# Patient Record
(demographics unavailable — no encounter records)

---

## 2024-12-11 NOTE — VITALS
[Maximal Pain Intensity: 0/10] : 0/10 [Least Pain Intensity: 0/10] : 0/10 [80: Normal activity with effort; some signs or symptoms of disease.] : 80: Normal activity with effort; some signs or symptoms of disease.  [Date: ____________] : Patient's last distress assessment performed on [unfilled]. [6 - Distress Level] : Distress Level: 6

## 2024-12-11 NOTE — PHYSICAL EXAM
[] : no respiratory distress [Exaggerated Use Of Accessory Muscles For Inspiration] : no accessory muscle use [Heart Rate And Rhythm] : heart rate and rhythm were normal [Arterial Pulses Normal] : the arterial pulses were normal [Abdomen Soft] : soft [Nondistended] : nondistended [Normal] : oriented to person, place and time, the affect was normal, the mood was normal and not anxious [Normal Oral Cavity] : oral cavity was normal [Oropharynx] : The oropharynx was normal [de-identified] : No neck nodes.  No supraclavicular nodes

## 2024-12-11 NOTE — PHYSICAL EXAM
[] : no respiratory distress [Exaggerated Use Of Accessory Muscles For Inspiration] : no accessory muscle use [Heart Rate And Rhythm] : heart rate and rhythm were normal [Arterial Pulses Normal] : the arterial pulses were normal [Abdomen Soft] : soft [Nondistended] : nondistended [Normal] : oriented to person, place and time, the affect was normal, the mood was normal and not anxious [Normal Oral Cavity] : oral cavity was normal [Oropharynx] : The oropharynx was normal [de-identified] : No neck nodes.  No supraclavicular nodes

## 2024-12-11 NOTE — HISTORY OF PRESENT ILLNESS
[FreeTextEntry1] : Mr. 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  He initially presented with unexpected weight loss associated with a poor appetite.    10/24/24 CT Abdomen/Pelvis ([facility])  Impression:  1. Moderate hiatal hernia. Prominent incompletely imaged thickening of the distal esophagus and hiatal hernia. Recommend correlation with upper endoscopy if not previously assessed.  2. S/p left nephrectomy. Cholelithiasis. No lymphadenopathy or ascites.   11/19/24 EGD with Dr. Bains showed a circumferential, friable, stenotic mass lesion extending from mid esophagus at 28cm to the EG Junction at 40cm. Pathology revealed an invasive poorly differentiated adenocarcinoma, with signet cell and mucinous features. The depth of invasion cannot be evaluated d/t the fragmentation and superficial nature of the specimens. MMR Intact.  He also had a colonoscopy done at the same time showing tubular adenoma. Colonic mucosa, negative for dysplasia. There is no evidence of high-grade dysplasia or malignancy.  CA-19-9: 301 U/ml  CEA: 16.6 ng/ml   11/26/24 CT Abdomen/Pelvis ([facility]) showed a moderate amount of stool in the colon. Hiatal hernia again noted. Prominent thickening of the visualized esophagus again noted. Exophytic component of the mass versus enlarged paraesophageal lymph node measuring ~ 1.9 x 1.3cm.  - No evidence of acute inflammatory process in the abdomen or pelvis.  - s/p left nephrectomy.   11/28/24 CT C/A/P ([facility])  Impression:  - Mass-like thickening of the distal esophagus compatible with primary malignancy.  - There is a 2cm right paraesophageal lymph node.  - Multiple tiny pulmonary nodules. Although they dont have suspicious features, they are technically indeterminate because of their multiplicity and the patient's history. They are too small to evaluate via PET-CT. Can consider close interval follow-up.   12/4/24 PET/CT ([facility])  1. Intense increased FDG avidity is associated with a segment of diffuse thickening of the mid and distal esophagus, SUV max 4.6  2. A 2cm paraesophageal node, has mild FDG avidity, SUV max 2.5. Another probable paraesophageal nodule at the level of the diaphragm measure 1.8 x 1.4cm, SUV max 2.7.  3. No FDG-avid pulmonary nodule. Previously seen small pulmonary nodules are below the resolution of PET-CT scan. A trace right pleural effusion is new and associated with minimal FDG avidity, SUV max 1.7.   12/10/24 Mr. Garnre presents today for consultation for radiation as referred by Dr. Gerardo. They discussed carboplatin/paclitaxel concurrently with radiation. Patient reports of fatigue, hiccups, occasional loss of voice & hoarseness, and left chest discomfort/pressure. He denies any chest pain, SOB, difficulty swallowing. He started drinking boost as recommended by Dr. Gerardo, he is able to tolerate food without difficulty.

## 2024-12-11 NOTE — REVIEW OF SYSTEMS
[Fatigue] : fatigue [Recent Change In Weight] : ~T recent weight change [Hoarseness] : hoarseness [Negative] : Allergic/Immunologic [Chest Pain] : no chest pain [Shortness Of Breath] : no shortness of breath [Abdominal Pain] : no abdominal pain [Constipation] : no constipation [Diarrhea] : no diarrhea [FreeTextEntry4] : hiccups [FreeTextEntry5] : left chest discomfort/pressure

## 2024-12-11 NOTE — HISTORY OF PRESENT ILLNESS
[FreeTextEntry1] : Mr. 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  He initially presented with unexpected weight loss associated with a poor appetite.    10/24/24 CT Abdomen/Pelvis ([facility])  Impression:  1. Moderate hiatal hernia. Prominent incompletely imaged thickening of the distal esophagus and hiatal hernia. Recommend correlation with upper endoscopy if not previously assessed.  2. S/p left nephrectomy. Cholelithiasis. No lymphadenopathy or ascites.   11/19/24 EGD with Dr. Bains showed a circumferential, friable, stenotic mass lesion extending from mid esophagus at 28cm to the EG Junction at 40cm. Pathology revealed an invasive poorly differentiated adenocarcinoma, with signet cell and mucinous features. The depth of invasion cannot be evaluated d/t the fragmentation and superficial nature of the specimens. MMR Intact.  He also had a colonoscopy done at the same time showing tubular adenoma. Colonic mucosa, negative for dysplasia. There is no evidence of high-grade dysplasia or malignancy.  CA-19-9: 301 U/ml  CEA: 16.6 ng/ml   11/26/24 CT Abdomen/Pelvis ([facility]) showed a moderate amount of stool in the colon. Hiatal hernia again noted. Prominent thickening of the visualized esophagus again noted. Exophytic component of the mass versus enlarged paraesophageal lymph node measuring ~ 1.9 x 1.3cm.  - No evidence of acute inflammatory process in the abdomen or pelvis.  - s/p left nephrectomy.   11/28/24 CT C/A/P ([facility])  Impression:  - Mass-like thickening of the distal esophagus compatible with primary malignancy.  - There is a 2cm right paraesophageal lymph node.  - Multiple tiny pulmonary nodules. Although they dont have suspicious features, they are technically indeterminate because of their multiplicity and the patient's history. They are too small to evaluate via PET-CT. Can consider close interval follow-up.   12/4/24 PET/CT ([facility])  1. Intense increased FDG avidity is associated with a segment of diffuse thickening of the mid and distal esophagus, SUV max 4.6  2. A 2cm paraesophageal node, has mild FDG avidity, SUV max 2.5. Another probable paraesophageal nodule at the level of the diaphragm measure 1.8 x 1.4cm, SUV max 2.7.  3. No FDG-avid pulmonary nodule. Previously seen small pulmonary nodules are below the resolution of PET-CT scan. A trace right pleural effusion is new and associated with minimal FDG avidity, SUV max 1.7.   12/10/24 Mr. Garner presents today for consultation for radiation as referred by Dr. Gerardo. They discussed carboplatin/paclitaxel concurrently with radiation. Patient reports of fatigue, hiccups, occasional loss of voice & hoarseness, and left chest discomfort/pressure. He denies any chest pain, SOB, difficulty swallowing. He started drinking boost as recommended by Dr. Gerardo, he is able to tolerate food without difficulty.

## 2024-12-20 NOTE — REVIEW OF SYSTEMS
[Fever] : no fever [Chills] : no chills [Feeling Tired] : feeling tired [Arthralgias] : arthralgias [Joint Stiffness] : joint stiffness [Negative] : Psychiatric

## 2024-12-20 NOTE — HISTORY OF PRESENT ILLNESS
[FreeTextEntry1] : MILLIE CISSE is a 73 year old M, nonsmoker, with PMHx of HTN, HLD, T2DM, PE, Afib on Eliquis, renal insufficiency, kidney donor (in 1986). He is referred by radiation oncology Dr. 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   Patient is planned for preoperative chemoRT (carboplatin and paclitaxel weekly + 4140cGy x 5 weeks) followed by surgical resection for durable local control and improved overall survival.  If for whatever the reason, he is not a candidate for surgery, will plan on converting his treatment strategy to definitive chemoradiation and increasing the radiation dose to 5040 cGy in 28 fractions along with concomitant chemotherapy.  He had the 1st cycle of chemotherapy on 12/17/2024. The radiation treatment will be started on 12/20/2024.   Hemotology: Dr. Gerardo.   Patient reports of fatigue, hiccups, occasional loss of voice & hoarseness, and left chest discomfort/pressure. He denies any chest pain, SOB, difficulty swallowing. He started drinking boost as recommended by Dr. Gerardo, he is able to tolerate food without difficulty. Workup as below.  EUS was not specifically recommended b/c would not .   12/4/24 PET/CT ([facility]) 1. Intense increased FDG avidity is associated with a segment of diffuse thickening of the mid and distal esophagus, SUV max 4.6 2. A 2cm paraesophageal node, has mild FDG avidity, SUV max 2.5. Another probable paraesophageal nodule at the level of the diaphragm measure 1.8 x 1.4cm, SUV max 2.7. 3. No FDG-avid pulmonary nodule. Previously seen small pulmonary nodules are below the resolution of PET-CT scan. A trace right pleural effusion is new and associated with minimal FDG avidity, SUV max 1.7.  11/28/24 CT C/A/P ([facility]) Impression: - Mass-like thickening of the distal esophagus compatible with primary malignancy. - There is a 2cm right paraesophageal lymph node. - Multiple tiny pulmonary nodules. Although they dont have suspicious features, they are technically indeterminate because of their multiplicity and the patient's history. They are too small to evaluate via PET-CT. Can consider close interval follow-up.  11/26/24 CT Abdomen/Pelvis ([facility]) showed a moderate amount of stool in the colon. Hiatal hernia again noted. Prominent thickening of the visualized esophagus again noted. Exophytic component of the mass versus enlarged paraesophageal lymph node measuring ~ 1.9 x 1.3cm. - No evidence of acute inflammatory process in the abdomen or pelvis. - s/p left nephrectomy.  11/19/24 EGD with Dr. Bains showed a circumferential, friable, stenotic mass lesion extending from mid esophagus at 28cm to the EG Junction at 40cm. Pathology revealed an invasive poorly differentiated adenocarcinoma, with signet cell and mucinous features. The depth of invasion cannot be evaluated d/t the fragmentation and superficial nature of the specimens. MMR Intact. He also had a colonoscopy done at the same time showing tubular adenoma. Colonic mucosa, negative for dysplasia. There is no evidence of high-grade dysplasia or malignancy.  10/24/24 CT Abdomen/Pelvis ([facility]) Impression: 1. Moderate hiatal hernia. Prominent incompletely imaged thickening of the distal esophagus and hiatal hernia. Recommend correlation with upper endoscopy if not previously assessed. 2. S/p left nephrectomy. Cholelithiasis. No lymphadenopathy or ascites.

## 2024-12-20 NOTE — HISTORY OF PRESENT ILLNESS
[FreeTextEntry1] : MILLIE CISSE is a 73 year old M, nonsmoker, with PMHx of HTN, HLD, T2DM, PE, Afib on Eliquis, renal insufficiency, kidney donor (in 1986). He is referred by radiation oncology Dr. 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   Patient is planned for preoperative chemoRT (carboplatin and paclitaxel weekly + 4140cGy x 5 weeks) followed by surgical resection for durable local control and improved overall survival.  If for whatever the reason, he is not a candidate for surgery, will plan on converting his treatment strategy to definitive chemoradiation and increasing the radiation dose to 5040 cGy in 28 fractions along with concomitant chemotherapy.  He had the 1st cycle of chemotherapy on 12/17/2024. The radiation treatment will be started on 12/20/2024.   Hemotology: Dr. Gerardo.   Patient reports of fatigue, hiccups, occasional loss of voice & hoarseness, and left chest discomfort/pressure. He denies any chest pain, SOB, difficulty swallowing. He started drinking boost as recommended by Dr. Gerardo, he is able to tolerate food without difficulty. Workup as below.  EUS was not specifically recommended b/c would not .   12/4/24 PET/CT ([facility]) 1. Intense increased FDG avidity is associated with a segment of diffuse thickening of the mid and distal esophagus, SUV max 4.6 2. A 2cm paraesophageal node, has mild FDG avidity, SUV max 2.5. Another probable paraesophageal nodule at the level of the diaphragm measure 1.8 x 1.4cm, SUV max 2.7. 3. No FDG-avid pulmonary nodule. Previously seen small pulmonary nodules are below the resolution of PET-CT scan. A trace right pleural effusion is new and associated with minimal FDG avidity, SUV max 1.7.  11/28/24 CT C/A/P ([facility]) Impression: - Mass-like thickening of the distal esophagus compatible with primary malignancy. - There is a 2cm right paraesophageal lymph node. - Multiple tiny pulmonary nodules. Although they dont have suspicious features, they are technically indeterminate because of their multiplicity and the patient's history. They are too small to evaluate via PET-CT. Can consider close interval follow-up.  11/26/24 CT Abdomen/Pelvis ([facility]) showed a moderate amount of stool in the colon. Hiatal hernia again noted. Prominent thickening of the visualized esophagus again noted. Exophytic component of the mass versus enlarged paraesophageal lymph node measuring ~ 1.9 x 1.3cm. - No evidence of acute inflammatory process in the abdomen or pelvis. - s/p left nephrectomy.  11/19/24 EGD with Dr. Bains showed a circumferential, friable, stenotic mass lesion extending from mid esophagus at 28cm to the EG Junction at 40cm. Pathology revealed an invasive poorly differentiated adenocarcinoma, with signet cell and mucinous features. The depth of invasion cannot be evaluated d/t the fragmentation and superficial nature of the specimens. MMR Intact. He also had a colonoscopy done at the same time showing tubular adenoma. Colonic mucosa, negative for dysplasia. There is no evidence of high-grade dysplasia or malignancy.  10/24/24 CT Abdomen/Pelvis ([facility]) Impression: 1. Moderate hiatal hernia. Prominent incompletely imaged thickening of the distal esophagus and hiatal hernia. Recommend correlation with upper endoscopy if not previously assessed. 2. S/p left nephrectomy. Cholelithiasis. No lymphadenopathy or ascites.

## 2024-12-20 NOTE — ASSESSMENT
[FreeTextEntry1] : 73 year old M, smoker/nonsmoker, with PMHx of HTN, HLD, T2DM, PE, Afib on Eliquis, renal insufficiency, kidney donor. He is referred by radiation oncology Dr. Pierson/CTS Dr. 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 Hemotology: Dr. Gerardo.  He started chemotherapy on 12/17/2024 and will have the 1st section of radiation on 12/20/2024.   Patient with locally advanced esophageal cancer, Stage III [id number]. He will need trimodality therapy with chemotherapy and radiation therapy followed by surgery. The risks and benefits of EGD, Robotic Assisted Esophagectomy, J-tube, Possible Laparotomy Possible Thoracotomy, including but not limited to risks of infection, bleeding, pneumonias, thromboembolic complications, arrhythmias myocardial infarction, need for open procedure, as well as the risks of anesthesia. Specific risks discussed included chyle leak, left recurrent laryngeal nerve injury requiring intervention, anastomotic leak requiring intervention with prolonged nothing per mouth (NPO) status, respiratory failure requiring tracheostomy tracheal fistula, short and long term diet modifications, death (5<%) as well as recurrence. A talya discussion of the procedure was performed and all questions were answered.  He wishes to proceed with plan. He will need a PET-CT 4 weeks after completing therapy and an ECHO, PFTs, and cardiology clearance prior to surgery.  Plan: Complete chemoradiation PET-CT 4 weeks after completing therapy EGD, Robotic Assisted Esophagectomy, J-tube, Possible Laparotomy Possible Thoracotomy TBD PFTs, ECHO, and cardiology clearance prior to surgery  I, Dr. Sherine Howell MD, personally performed the evaluation and management (E/M) services for this new patient.  That E/M includes conducting the clinically appropriate initial history &/or exam, assessing all conditions, and establishing the plan of care.  I have also independently reviewed the medical records and imaging at the time of this office visit, and discussed the above mentioned images with interpretations with the patient. Today, my ERIC, Aishwarya Bowen, was here to observe &/or participate in the visit & follow plan of care established by me.

## 2024-12-20 NOTE — ASSESSMENT
[FreeTextEntry1] : 73 year old M, smoker/nonsmoker, with PMHx of HTN, HLD, T2DM, PE, Afib on Eliquis, renal insufficiency, kidney donor. He is referred by radiation oncology Dr. Pierson/CTS Dr. 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 Hemotology: Dr. Gerardo.  He started chemotherapy on 12/17/2024 and will have the 1st section of radiation on 12/20/2024.   Patient with locally advanced esophageal cancer, Stage III [id number]. He will need trimodality therapy with chemotherapy and radiation therapy followed by surgery. The risks and benefits of EGD, Robotic Assisted Esophagectomy, J-tube, Possible Laparotomy Possible Thoracotomy, including but not limited to risks of infection, bleeding, pneumonias, thromboembolic complications, arrhythmias myocardial infarction, need for open procedure, as well as the risks of anesthesia. Specific risks discussed included chyle leak, left recurrent laryngeal nerve injury requiring intervention, anastomotic leak requiring intervention with prolonged nothing per mouth (NPO) status, respiratory failure requiring tracheostomy tracheal fistula, short and long term diet modifications, death (5<%) as well as recurrence. A talya discussion of the procedure was performed and all questions were answered.  He wishes to proceed with plan. He will need a PET-CT 4 weeks after completing therapy and an ECHO, PFTs, and cardiology clearance prior to surgery.  Plan: Complete chemoradiation PET-CT 4 weeks after completing therapy EGD, Robotic Assisted Esophagectomy, J-tube, Possible Laparotomy Possible Thoracotomy TBD PFTs, ECHO, and cardiology clearance prior to surgery  I, Dr. Sherine Howell MD, personally performed the evaluation and management (E/M) services for this new patient.  That E/M includes conducting the clinically appropriate initial history &/or exam, assessing all conditions, and establishing the plan of care.  I have also independently reviewed the medical records and imaging at the time of this office visit, and discussed the above mentioned images with interpretations with the patient. Today, my ERIC, Aishwarya Bowen, was here to observe &/or participate in the visit & follow plan of care established by me.

## 2024-12-20 NOTE — ASSESSMENT
[FreeTextEntry1] : 73 year old M, smoker/nonsmoker, with PMHx of HTN, HLD, T2DM, PE, Afib on Eliquis, renal insufficiency, kidney donor. He is referred by radiation oncology Dr. Pierson/CTS Dr. 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 Hemotology: Dr. Gerardo.  He started chemotherapy on 12/17/2024 and will have the 1st section of radiation on 12/20/2024.   Patient with locally advanced esophageal cancer, Stage III [id number]. He will need trimodality therapy with chemotherapy and radiation therapy followed by surgery. The risks and benefits of EGD, Robotic Assisted Esophagectomy, J-tube, Possible Laparotomy Possible Thoracotomy, including but not limited to risks of infection, bleeding, pneumonias, thromboembolic complications, arrhythmias myocardial infarction, need for open procedure, as well as the risks of anesthesia. Specific risks discussed included chyle leak, left recurrent laryngeal nerve injury requiring intervention, anastomotic leak requiring intervention with prolonged nothing per mouth (NPO) status, respiratory failure requiring tracheostomy tracheal fistula, short and long term diet modifications, death (5<%) as well as recurrence. A talya discussion of the procedure was performed and all questions were answered.  He wishes to proceed with plan. He will need a PET-CT 4 weeks after completing therapy and an ECHO, PFTs, and cardiology clearance prior to surgery.  Plan: Complete chemoradiation PET-CT 4 weeks after completing therapy EGD, Robotic Assisted Esophagectomy, J-tube, Possible Laparotomy Possible Thoracotomy TBD PFTs, ECHO, and cardiology clearance prior to surgery  I, Dr. Sherine Howell MD, personally performed the evaluation and management (E/M) services for this new patient.  That E/M includes conducting the clinically appropriate initial history &/or exam, assessing all conditions, and establishing the plan of care.  I have also independently reviewed the medical records and imaging at the time of this office visit, and discussed the above mentioned images with interpretations with the patient. Today, my ERIC, Aishwarya Bowen, was here to observe &/or participate in the visit & follow plan of care established by me.

## 2024-12-20 NOTE — HISTORY OF PRESENT ILLNESS
[FreeTextEntry1] : MILLIE CISSE is a 73 year old M, nonsmoker, with PMHx of HTN, HLD, T2DM, PE, Afib on Eliquis, renal insufficiency, kidney donor (in 1986). He is referred by radiation oncology Dr. 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   Patient is planned for preoperative chemoRT (carboplatin and paclitaxel weekly + 4140cGy x 5 weeks) followed by surgical resection for durable local control and improved overall survival.  If for whatever the reason, he is not a candidate for surgery, will plan on converting his treatment strategy to definitive chemoradiation and increasing the radiation dose to 5040 cGy in 28 fractions along with concomitant chemotherapy.  He had the 1st cycle of chemotherapy on 12/17/2024. The radiation treatment will be started on 12/20/2024.   Hemotology: Dr. Gerardo.   Patient reports of fatigue, hiccups, occasional loss of voice & hoarseness, and left chest discomfort/pressure. He denies any chest pain, SOB, difficulty swallowing. He started drinking boost as recommended by Dr. Gerardo, he is able to tolerate food without difficulty. Workup as below.  EUS was not specifically recommended b/c would not .   12/4/24 PET/CT ([facility]) 1. Intense increased FDG avidity is associated with a segment of diffuse thickening of the mid and distal esophagus, SUV max 4.6 2. A 2cm paraesophageal node, has mild FDG avidity, SUV max 2.5. Another probable paraesophageal nodule at the level of the diaphragm measure 1.8 x 1.4cm, SUV max 2.7. 3. No FDG-avid pulmonary nodule. Previously seen small pulmonary nodules are below the resolution of PET-CT scan. A trace right pleural effusion is new and associated with minimal FDG avidity, SUV max 1.7.  11/28/24 CT C/A/P ([facility]) Impression: - Mass-like thickening of the distal esophagus compatible with primary malignancy. - There is a 2cm right paraesophageal lymph node. - Multiple tiny pulmonary nodules. Although they dont have suspicious features, they are technically indeterminate because of their multiplicity and the patient's history. They are too small to evaluate via PET-CT. Can consider close interval follow-up.  11/26/24 CT Abdomen/Pelvis ([facility]) showed a moderate amount of stool in the colon. Hiatal hernia again noted. Prominent thickening of the visualized esophagus again noted. Exophytic component of the mass versus enlarged paraesophageal lymph node measuring ~ 1.9 x 1.3cm. - No evidence of acute inflammatory process in the abdomen or pelvis. - s/p left nephrectomy.  11/19/24 EGD with Dr. Bains showed a circumferential, friable, stenotic mass lesion extending from mid esophagus at 28cm to the EG Junction at 40cm. Pathology revealed an invasive poorly differentiated adenocarcinoma, with signet cell and mucinous features. The depth of invasion cannot be evaluated d/t the fragmentation and superficial nature of the specimens. MMR Intact. He also had a colonoscopy done at the same time showing tubular adenoma. Colonic mucosa, negative for dysplasia. There is no evidence of high-grade dysplasia or malignancy.  10/24/24 CT Abdomen/Pelvis ([facility]) Impression: 1. Moderate hiatal hernia. Prominent incompletely imaged thickening of the distal esophagus and hiatal hernia. Recommend correlation with upper endoscopy if not previously assessed. 2. S/p left nephrectomy. Cholelithiasis. No lymphadenopathy or ascites.

## 2024-12-20 NOTE — PHYSICAL EXAM
[General Appearance - Alert] : alert [General Appearance - In No Acute Distress] : in no acute distress [Neck Appearance] : the appearance of the neck was normal [Neck Cervical Mass (___cm)] : no neck mass was observed [Jugular Venous Distention Increased] : there was no jugular-venous distention [Thyroid Diffuse Enlargement] : the thyroid was not enlarged [Thyroid Nodule] : there were no palpable thyroid nodules [Auscultation Breath Sounds / Voice Sounds] : lungs were clear to auscultation bilaterally [Heart Rate And Rhythm] : heart rate was normal and rhythm regular [Heart Sounds] : normal S1 and S2 [Heart Sounds Gallop] : no gallops [Murmurs] : no murmurs [Heart Sounds Pericardial Friction Rub] : no pericardial rub [Examination Of The Chest] : the chest was normal in appearance [Chest Visual Inspection Thoracic Asymmetry] : no chest asymmetry [Diminished Respiratory Excursion] : normal chest expansion [Bowel Sounds] : normal bowel sounds [Abdomen Soft] : soft [Abdomen Tenderness] : non-tender [] : no hepato-splenomegaly [Abdomen Mass (___ Cm)] : no abdominal mass palpated [Deep Tendon Reflexes (DTR)] : deep tendon reflexes were 2+ and symmetric [Sensation] : the sensory exam was normal to light touch and pinprick [No Focal Deficits] : no focal deficits [Oriented To Time, Place, And Person] : oriented to person, place, and time [Impaired Insight] : insight and judgment were intact [Affect] : the affect was normal

## 2024-12-27 NOTE — HISTORY OF PRESENT ILLNESS
[FreeTextEntry1] : Mr. 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  He initially presented with unexpected weight loss associated with a poor appetite.  10/24/24 CT Abdomen/Pelvis ([facility]) Impression: 1. Moderate hiatal hernia. Prominent incompletely imaged thickening of the distal esophagus and hiatal hernia. Recommend correlation with upper endoscopy if not previously assessed. 2. S/p left nephrectomy. Cholelithiasis. No lymphadenopathy or ascites.  11/19/24 EGD with Dr. Bains showed a circumferential, friable, stenotic mass lesion extending from mid esophagus at 28cm to the EG Junction at 40cm. Pathology revealed an invasive poorly differentiated adenocarcinoma, with signet cell and mucinous features. The depth of invasion cannot be evaluated d/t the fragmentation and superficial nature of the specimens. MMR Intact. He also had a colonoscopy done at the same time showing tubular adenoma. Colonic mucosa, negative for dysplasia. There is no evidence of high-grade dysplasia or malignancy.  CA-19-9: 301 U/ml CEA: 16.6 ng/ml  11/26/24 CT Abdomen/Pelvis ([facility]) showed a moderate amount of stool in the colon. Hiatal hernia again noted. Prominent thickening of the visualized esophagus again noted. Exophytic component of the mass versus enlarged paraesophageal lymph node measuring ~ 1.9 x 1.3cm. - No evidence of acute inflammatory process in the abdomen or pelvis. - s/p left nephrectomy.  11/28/24 CT C/A/P ([facility]) Impression: - Mass-like thickening of the distal esophagus compatible with primary malignancy. - There is a 2cm right paraesophageal lymph node. - Multiple tiny pulmonary nodules. Although they dont have suspicious features, they are technically indeterminate because of their multiplicity and the patient's history. They are too small to evaluate via PET-CT. Can consider close interval follow-up.  12/4/24 PET/CT ([facility]) 1. Intense increased FDG avidity is associated with a segment of diffuse thickening of the mid and distal esophagus, SUV max 4.6 2. A 2cm paraesophageal node, has mild FDG avidity, SUV max 2.5. Another probable paraesophageal nodule at the level of the diaphragm measure 1.8 x 1.4cm, SUV max 2.7. 3. No FDG-avid pulmonary nodule. Previously seen small pulmonary nodules are below the resolution of PET-CT scan. A trace right pleural effusion is new and associated with minimal FDG avidity, SUV max 1.7.  12/10/24 Mr. Garner presents today for consultation for radiation as referred by Dr. Gerardo. They discussed carboplatin/paclitaxel concurrently with radiation. Patient reports of fatigue, hiccups, occasional loss of voice & hoarseness, and left chest discomfort/pressure. He denies any chest pain, SOB, difficulty swallowing. He started drinking boost as recommended by Dr. Gerardo, he is able to tolerate food without difficulty.  12/27/2024 Mr. Garner presents today for his OTV, completed 5/23 fractions to the esophagus/LN to a dose of 900 cGy. He states that he is overall feeling well. He is able to eat mostly any foods, spicy is a bit more difficult. HE eats in smaller pieces. He denies any pain or discomfort. He does have some fatigue; He has received 2 cycles of chemotherapy with Dr. Gerardo. He feels that he is tolerating the treatments well. He is going to see the Dietician, Ame, mitali for some additional information with eating. We discussed some skin care in case of any irritation at the treatment site.

## 2024-12-27 NOTE — DISEASE MANAGEMENT
[Clinical] : TNM Stage: c [III] : III [TTNM] : 3 [NTNM] : 1 [MTNM] : 0 [de-identified] : 900 [[place] [id number]] : [id number] [de-identified] : completed 5/23 fractions to the esophagus/LN to a dose of 900 cGy

## 2024-12-27 NOTE — DISEASE MANAGEMENT
[Clinical] : TNM Stage: c [III] : III [TTNM] : 3 [NTNM] : 1 [MTNM] : 0 [de-identified] : 900 [[place] [id number]] : 2043 [de-identified] : completed 5/23 fractions to the esophagus/LN to a dose of 900 cGy

## 2024-12-31 NOTE — REVIEW OF SYSTEMS
[Nausea: Grade 0] : Nausea: Grade 0 [Fatigue: Grade 1 - Fatigue relieved by rest] : Fatigue: Grade 1 - Fatigue relieved by rest [Cough: Grade 0] : Cough: Grade 0 [Dyspnea: Grade 0] : Dyspnea: Grade 0 [Hoarseness: Grade 0] : Hoarseness: Grade 0 [Pneumonitis: Grade 0] : Pneumonitis: Grade 0

## 2024-12-31 NOTE — HISTORY OF PRESENT ILLNESS
[FreeTextEntry1] : Mr. 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  He initially presented with unexpected weight loss associated with a poor appetite.  10/24/24 CT Abdomen/Pelvis ([facility]) Impression: 1. Moderate hiatal hernia. Prominent incompletely imaged thickening of the distal esophagus and hiatal hernia. Recommend correlation with upper endoscopy if not previously assessed. 2. S/p left nephrectomy. Cholelithiasis. No lymphadenopathy or ascites.  11/19/24 EGD with Dr. Bains showed a circumferential, friable, stenotic mass lesion extending from mid esophagus at 28cm to the EG Junction at 40cm. Pathology revealed an invasive poorly differentiated adenocarcinoma, with signet cell and mucinous features. The depth of invasion cannot be evaluated d/t the fragmentation and superficial nature of the specimens. MMR Intact. He also had a colonoscopy done at the same time showing tubular adenoma. Colonic mucosa, negative for dysplasia. There is no evidence of high-grade dysplasia or malignancy.  CA-19-9: 301 U/ml CEA: 16.6 ng/ml  11/26/24 CT Abdomen/Pelvis ([facility]) showed a moderate amount of stool in the colon. Hiatal hernia again noted. Prominent thickening of the visualized esophagus again noted. Exophytic component of the mass versus enlarged paraesophageal lymph node measuring ~ 1.9 x 1.3cm. - No evidence of acute inflammatory process in the abdomen or pelvis. - s/p left nephrectomy.  11/28/24 CT C/A/P ([facility]) Impression: - Mass-like thickening of the distal esophagus compatible with primary malignancy. - There is a 2cm right paraesophageal lymph node. - Multiple tiny pulmonary nodules. Although they dont have suspicious features, they are technically indeterminate because of their multiplicity and the patient's history. They are too small to evaluate via PET-CT. Can consider close interval follow-up.  12/4/24 PET/CT ([facility]) 1. Intense increased FDG avidity is associated with a segment of diffuse thickening of the mid and distal esophagus, SUV max 4.6 2. A 2cm paraesophageal node, has mild FDG avidity, SUV max 2.5. Another probable paraesophageal nodule at the level of the diaphragm measure 1.8 x 1.4cm, SUV max 2.7. 3. No FDG-avid pulmonary nodule. Previously seen small pulmonary nodules are below the resolution of PET-CT scan. A trace right pleural effusion is new and associated with minimal FDG avidity, SUV max 1.7.  12/10/24 Mr. Garner presents today for consultation for radiation as referred by Dr. Gerardo. They discussed carboplatin/paclitaxel concurrently with radiation. Patient reports of fatigue, hiccups, occasional loss of voice & hoarseness, and left chest discomfort/pressure. He denies any chest pain, SOB, difficulty swallowing. He started drinking boost as recommended by Dr. Gerardo, he is able to tolerate food without difficulty.  12/27/2024 Mr. Garner presents today for his OTV, completed 5/23 fractions to the esophagus/LN to a dose of 900 cGy. He states that he is overall feeling well. He is able to eat mostly any foods, spicy is a bit more difficult. HE eats in smaller pieces. He denies any pain or discomfort. He does have some fatigue; He has received 2 cycles of chemotherapy with Dr. Gerardo. He feels that he is tolerating the treatments well. He is going to see the Dietician, mitali Mccloud for some additional information with eating. We discussed some skin care in case of any irritation at the treatment site.   12/31/24 FX 7 of 28 Doing well with RT.  he reports that his third cycle of Taxol was held today DT an ANC of 800.  Dr Gerardo was aware but said to proceed with his RT.  he will have another CBC done on 1/2/24.  He is eating very well and taking 2 boost drinks per day.  He did loose 7 Lbs since last week.

## 2024-12-31 NOTE — DISEASE MANAGEMENT
[Clinical] : TNM Stage: c [III] : III [TTNM] : 3 [NTNM] : 1 [MTNM] : 0 [de-identified] : [id number] [[place] [id number]] : [id number] [de-identified] : completed 5/23 fractions to the esophagus/LN to a dose of 900 cGy

## 2025-01-07 NOTE — REVIEW OF SYSTEMS
[Dyspepsia: Grade 0] : Dyspepsia: Grade 0 [Dysphagia: Grade 0] : Dysphagia: Grade 0 [Nausea: Grade 0] : Nausea: Grade 0 [Vomiting: Grade 0] : Vomiting: Grade 0 [Fatigue: Grade 1 - Fatigue relieved by rest] : Fatigue: Grade 1 - Fatigue relieved by rest

## 2025-01-07 NOTE — HISTORY OF PRESENT ILLNESS
[FreeTextEntry1] : Mr. 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  He initially presented with unexpected weight loss associated with a poor appetite.  10/24/24 CT Abdomen/Pelvis ([facility]) Impression: 1. Moderate hiatal hernia. Prominent incompletely imaged thickening of the distal esophagus and hiatal hernia. Recommend correlation with upper endoscopy if not previously assessed. 2. S/p left nephrectomy. Cholelithiasis. No lymphadenopathy or ascites.  11/19/24 EGD with Dr. Bains showed a circumferential, friable, stenotic mass lesion extending from mid esophagus at 28cm to the EG Junction at 40cm. Pathology revealed an invasive poorly differentiated adenocarcinoma, with signet cell and mucinous features. The depth of invasion cannot be evaluated d/t the fragmentation and superficial nature of the specimens. MMR Intact. He also had a colonoscopy done at the same time showing tubular adenoma. Colonic mucosa, negative for dysplasia. There is no evidence of high-grade dysplasia or malignancy.  CA-19-9: 301 U/ml CEA: 16.6 ng/ml  11/26/24 CT Abdomen/Pelvis ([facility]) showed a moderate amount of stool in the colon. Hiatal hernia again noted. Prominent thickening of the visualized esophagus again noted. Exophytic component of the mass versus enlarged paraesophageal lymph node measuring ~ 1.9 x 1.3cm. - No evidence of acute inflammatory process in the abdomen or pelvis. - s/p left nephrectomy.  11/28/24 CT C/A/P ([facility]) Impression: - Mass-like thickening of the distal esophagus compatible with primary malignancy. - There is a 2cm right paraesophageal lymph node. - Multiple tiny pulmonary nodules. Although they dont have suspicious features, they are technically indeterminate because of their multiplicity and the patient's history. They are too small to evaluate via PET-CT. Can consider close interval follow-up.  12/4/24 PET/CT ([facility]) 1. Intense increased FDG avidity is associated with a segment of diffuse thickening of the mid and distal esophagus, SUV max 4.6 2. A 2cm paraesophageal node, has mild FDG avidity, SUV max 2.5. Another probable paraesophageal nodule at the level of the diaphragm measure 1.8 x 1.4cm, SUV max 2.7. 3. No FDG-avid pulmonary nodule. Previously seen small pulmonary nodules are below the resolution of PET-CT scan. A trace right pleural effusion is new and associated with minimal FDG avidity, SUV max 1.7.  12/10/24 Mr. Garner presents today for consultation for radiation as referred by Dr. Gerardo. They discussed carboplatin/paclitaxel concurrently with radiation. Patient reports of fatigue, hiccups, occasional loss of voice & hoarseness, and left chest discomfort/pressure. He denies any chest pain, SOB, difficulty swallowing. He started drinking boost as recommended by Dr. Gerardo, he is able to tolerate food without difficulty.  12/27/2024 Mr. Garner presents today for his OTV, completed 5/23 fractions to the esophagus/LN to a dose of 900 cGy. He states that he is overall feeling well. He is able to eat mostly any foods, spicy is a bit more difficult. HE eats in smaller pieces. He denies any pain or discomfort. He does have some fatigue; He has received 2 cycles of chemotherapy with Dr. Gerardo. He feels that he is tolerating the treatments well. He is going to see the Dietician, mitali Mccloud for some additional information with eating. We discussed some skin care in case of any irritation at the treatment site.   12/31/24 FX 7 of 28 Doing well with RT.  he reports that his third cycle of Taxol was held today DT an ANC of 800.  Dr Gerardo was aware but said to proceed with his RT.  he will have another CBC done on 1/2/24.  He is eating very well and taking 2 boost drinks per day.  He did loose 7 Lbs since last week.    1/7/24 FX 11 Doing very well with TX.  Able to eat all solid foods.  He will see Dr Schaffer for labs and f/u 1/9.  He willl bring us a copy of his cbc.

## 2025-01-07 NOTE — DISEASE MANAGEMENT
[Clinical] : TNM Stage: c [III] : III [TTNM] : 3 [NTNM] : 1 [MTNM] : 0 [de-identified] : [id number] [[place] [address]] : 1480 [de-identified] : completed 5/23 fractions to the esophagus/LN to a dose of 900 cGy

## 2025-01-17 NOTE — REVIEW OF SYSTEMS
[Dyspepsia: Grade 0] : Dyspepsia: Grade 0 [Dysphagia: Grade 0] : Dysphagia: Grade 0 [Nausea: Grade 0] : Nausea: Grade 0 [Vomiting: Grade 0] : Vomiting: Grade 0 [Xerostomia: Grade 0] : Xerostomia: Grade 0 [Dermatitis Radiation: Grade 0] : Dermatitis Radiation: Grade 0 [Fatigue: Grade 0] : Fatigue: Grade 0

## 2025-01-17 NOTE — DISEASE MANAGEMENT
[Clinical] : TNM Stage: c [III] : III [TTNM] : 3 [NTNM] : 1 [MTNM] : 0 [de-identified] : 1541 [[place] [id number]] : 6506 [de-identified] : completed 16/23 fractions to the esophagus/LN to a dose of 2880 cGy

## 2025-01-17 NOTE — HISTORY OF PRESENT ILLNESS
Received phone call from radiology reporting a significant finding. Routing to Dr. Avani Renteria team as a [facility]. The patient is a 67y Male complaining of flank pain. [FreeTextEntry1] : Mr. 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  He initially presented with unexpected weight loss associated with a poor appetite.  10/24/24 CT Abdomen/Pelvis ([facility]) Impression: 1. Moderate hiatal hernia. Prominent incompletely imaged thickening of the distal esophagus and hiatal hernia. Recommend correlation with upper endoscopy if not previously assessed. 2. S/p left nephrectomy. Cholelithiasis. No lymphadenopathy or ascites.  11/19/24 EGD with Dr. Bains showed a circumferential, friable, stenotic mass lesion extending from mid esophagus at 28cm to the EG Junction at 40cm. Pathology revealed an invasive poorly differentiated adenocarcinoma, with signet cell and mucinous features. The depth of invasion cannot be evaluated d/t the fragmentation and superficial nature of the specimens. MMR Intact. He also had a colonoscopy done at the same time showing tubular adenoma. Colonic mucosa, negative for dysplasia. There is no evidence of high-grade dysplasia or malignancy.  CA-19-9: 301 U/ml CEA: 16.6 ng/ml  11/26/24 CT Abdomen/Pelvis ([facility]) showed a moderate amount of stool in the colon. Hiatal hernia again noted. Prominent thickening of the visualized esophagus again noted. Exophytic component of the mass versus enlarged paraesophageal lymph node measuring ~ 1.9 x 1.3cm. - No evidence of acute inflammatory process in the abdomen or pelvis. - s/p left nephrectomy.  11/28/24 CT C/A/P ([facility]) Impression: - Mass-like thickening of the distal esophagus compatible with primary malignancy. - There is a 2cm right paraesophageal lymph node. - Multiple tiny pulmonary nodules. Although they dont have suspicious features, they are technically indeterminate because of their multiplicity and the patient's history. They are too small to evaluate via PET-CT. Can consider close interval follow-up.  12/4/24 PET/CT ([facility]) 1. Intense increased FDG avidity is associated with a segment of diffuse thickening of the mid and distal esophagus, SUV max 4.6 2. A 2cm paraesophageal node, has mild FDG avidity, SUV max 2.5. Another probable paraesophageal nodule at the level of the diaphragm measure 1.8 x 1.4cm, SUV max 2.7. 3. No FDG-avid pulmonary nodule. Previously seen small pulmonary nodules are below the resolution of PET-CT scan. A trace right pleural effusion is new and associated with minimal FDG avidity, SUV max 1.7.  12/10/24 Mr. Garner presents today for consultation for radiation as referred by Dr. Gerardo. They discussed carboplatin/paclitaxel concurrently with radiation. Patient reports of fatigue, hiccups, occasional loss of voice & hoarseness, and left chest discomfort/pressure. He denies any chest pain, SOB, difficulty swallowing. He started drinking boost as recommended by Dr. Gerardo, he is able to tolerate food without difficulty.  12/27/2024 Mr. Garner presents today for his OTV, completed 5/23 fractions to the esophagus/LN to a dose of 900 cGy. He states that he is overall feeling well. He is able to eat mostly any foods, spicy is a bit more difficult. HE eats in smaller pieces. He denies any pain or discomfort. He does have some fatigue; He has received 2 cycles of chemotherapy with Dr. Gerardo. He feels that he is tolerating the treatments well. He is going to see the Dietician, mitali Mccloud for some additional information with eating. We discussed some skin care in case of any irritation at the treatment site.  12/31/24 FX 7 of 28 Doing well with RT. he reports that his third cycle of Taxol was held today DT an ANC of 800. Dr Gerardo was aware but said to proceed with his RT. he will have another CBC done on 1/2/24. He is eating very well and taking 2 boost drinks per day. He did loose 7 Lbs since last week.  1/7/24 FX 11 Doing very well with TX. Able to eat all solid foods. He will see Dr Gerardo for labs and f/u 1/9. He will bring us a copy of his cbc.  1/14/2025 Mr. Garner presents today for his OTV, completed 16/23 fractions to the esophagus/LN to a dose of 2880 cGy. He states that he has a slight burning sensation at the base of his esophagus but that he is eating mainly bland foods and nothing spicy. He has 3 Boost a day. He noticed an increase in his fatigue this weekend.

## 2025-01-17 NOTE — PHYSICAL EXAM
[] : no respiratory distress [Exaggerated Use Of Accessory Muscles For Inspiration] : no accessory muscle use [Nondistended] : nondistended [Abdomen Tenderness] : non-tender [Normal] : oriented to person, place and time, the affect was normal, the mood was normal and not anxious

## 2025-01-17 NOTE — HISTORY OF PRESENT ILLNESS
[FreeTextEntry1] : Mr. 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  He initially presented with unexpected weight loss associated with a poor appetite.  10/24/24 CT Abdomen/Pelvis ([facility]) Impression: 1. Moderate hiatal hernia. Prominent incompletely imaged thickening of the distal esophagus and hiatal hernia. Recommend correlation with upper endoscopy if not previously assessed. 2. S/p left nephrectomy. Cholelithiasis. No lymphadenopathy or ascites.  11/19/24 EGD with Dr. Bains showed a circumferential, friable, stenotic mass lesion extending from mid esophagus at 28cm to the EG Junction at 40cm. Pathology revealed an invasive poorly differentiated adenocarcinoma, with signet cell and mucinous features. The depth of invasion cannot be evaluated d/t the fragmentation and superficial nature of the specimens. MMR Intact. He also had a colonoscopy done at the same time showing tubular adenoma. Colonic mucosa, negative for dysplasia. There is no evidence of high-grade dysplasia or malignancy.  CA-19-9: 301 U/ml CEA: 16.6 ng/ml  11/26/24 CT Abdomen/Pelvis ([facility]) showed a moderate amount of stool in the colon. Hiatal hernia again noted. Prominent thickening of the visualized esophagus again noted. Exophytic component of the mass versus enlarged paraesophageal lymph node measuring ~ 1.9 x 1.3cm. - No evidence of acute inflammatory process in the abdomen or pelvis. - s/p left nephrectomy.  11/28/24 CT C/A/P ([facility]) Impression: - Mass-like thickening of the distal esophagus compatible with primary malignancy. - There is a 2cm right paraesophageal lymph node. - Multiple tiny pulmonary nodules. Although they dont have suspicious features, they are technically indeterminate because of their multiplicity and the patient's history. They are too small to evaluate via PET-CT. Can consider close interval follow-up.  12/4/24 PET/CT ([facility]) 1. Intense increased FDG avidity is associated with a segment of diffuse thickening of the mid and distal esophagus, SUV max 4.6 2. A 2cm paraesophageal node, has mild FDG avidity, SUV max 2.5. Another probable paraesophageal nodule at the level of the diaphragm measure 1.8 x 1.4cm, SUV max 2.7. 3. No FDG-avid pulmonary nodule. Previously seen small pulmonary nodules are below the resolution of PET-CT scan. A trace right pleural effusion is new and associated with minimal FDG avidity, SUV max 1.7.  12/10/24 Mr. Garner presents today for consultation for radiation as referred by Dr. Gerardo. They discussed carboplatin/paclitaxel concurrently with radiation. Patient reports of fatigue, hiccups, occasional loss of voice & hoarseness, and left chest discomfort/pressure. He denies any chest pain, SOB, difficulty swallowing. He started drinking boost as recommended by Dr. Gerardo, he is able to tolerate food without difficulty.  12/27/2024 Mr. Garner presents today for his OTV, completed 5/23 fractions to the esophagus/LN to a dose of 900 cGy. He states that he is overall feeling well. He is able to eat mostly any foods, spicy is a bit more difficult. HE eats in smaller pieces. He denies any pain or discomfort. He does have some fatigue; He has received 2 cycles of chemotherapy with Dr. Gerardo. He feels that he is tolerating the treatments well. He is going to see the Dietician, mitali Mccloud for some additional information with eating. We discussed some skin care in case of any irritation at the treatment site.  12/31/24 FX 7 of 28 Doing well with RT. he reports that his third cycle of Taxol was held today DT an ANC of 800. Dr Gerardo was aware but said to proceed with his RT. he will have another CBC done on 1/2/24. He is eating very well and taking 2 boost drinks per day. He did loose 7 Lbs since last week.  1/7/24 FX 11 Doing very well with TX. Able to eat all solid foods. He will see Dr Gerardo for labs and f/u 1/9. He will bring us a copy of his cbc.  1/14/2025 Mr. Garner presents today for his OTV, completed 16/23 fractions to the esophagus/LN to a dose of 2880 cGy. He states that he has a slight burning sensation at the base of his esophagus but that he is eating mainly bland foods and nothing spicy. He has 3 Boost a day. He noticed an increase in his fatigue this weekend.

## 2025-01-17 NOTE — DISEASE MANAGEMENT
[Clinical] : TNM Stage: c [III] : III [TTNM] : 3 [NTNM] : 1 [MTNM] : 0 [de-identified] : [id number] [[place] [id number]] : [id number] [de-identified] : completed 16/23 fractions to the esophagus/LN to a dose of 2880 cGy

## 2025-01-21 NOTE — REVIEW OF SYSTEMS
[Constipation: Grade 0] : Constipation: Grade 0 [Diarrhea: Grade 0] : Diarrhea: Grade 0 [Esophagitis: Grade 2 - Symptomatic; altered eating/swallowing;  oral supplements indicated] : Esophagitis: Grade 2 - Symptomatic; altered eating/swallowing;  oral supplements indicated [Nausea: Grade 0] : Nausea: Grade 0 [Vomiting: Grade 0] : Vomiting: Grade 0 [Fatigue: Grade 2 - Fatigue not relieved by rest; limiting instrumental ADL] : Fatigue: Grade 2 - Fatigue not relieved by rest; limiting instrumental ADL [Dermatitis Radiation: Grade 0] : Dermatitis Radiation: Grade 0

## 2025-01-22 NOTE — HISTORY OF PRESENT ILLNESS
[FreeTextEntry1] : Mr. 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  He initially presented with unexpected weight loss associated with a poor appetite.  10/24/24 CT Abdomen/Pelvis ([facility]) Impression: 1. Moderate hiatal hernia. Prominent incompletely imaged thickening of the distal esophagus and hiatal hernia. Recommend correlation with upper endoscopy if not previously assessed. 2. S/p left nephrectomy. Cholelithiasis. No lymphadenopathy or ascites.  11/19/24 EGD with Dr. Bains showed a circumferential, friable, stenotic mass lesion extending from mid esophagus at 28cm to the EG Junction at 40cm. Pathology revealed an invasive poorly differentiated adenocarcinoma, with signet cell and mucinous features. The depth of invasion cannot be evaluated d/t the fragmentation and superficial nature of the specimens. MMR Intact. He also had a colonoscopy done at the same time showing tubular adenoma. Colonic mucosa, negative for dysplasia. There is no evidence of high-grade dysplasia or malignancy.  CA-19-9: 301 U/ml CEA: 16.6 ng/ml  11/26/24 CT Abdomen/Pelvis ([facility]) showed a moderate amount of stool in the colon. Hiatal hernia again noted. Prominent thickening of the visualized esophagus again noted. Exophytic component of the mass versus enlarged paraesophageal lymph node measuring ~ 1.9 x 1.3cm. - No evidence of acute inflammatory process in the abdomen or pelvis. - s/p left nephrectomy.  11/28/24 CT C/A/P ([facility]) Impression: - Mass-like thickening of the distal esophagus compatible with primary malignancy. - There is a 2cm right paraesophageal lymph node. - Multiple tiny pulmonary nodules. Although they dont have suspicious features, they are technically indeterminate because of their multiplicity and the patient's history. They are too small to evaluate via PET-CT. Can consider close interval follow-up.  12/4/24 PET/CT ([facility]) 1. Intense increased FDG avidity is associated with a segment of diffuse thickening of the mid and distal esophagus, SUV max 4.6 2. A 2cm paraesophageal node, has mild FDG avidity, SUV max 2.5. Another probable paraesophageal nodule at the level of the diaphragm measure 1.8 x 1.4cm, SUV max 2.7. 3. No FDG-avid pulmonary nodule. Previously seen small pulmonary nodules are below the resolution of PET-CT scan. A trace right pleural effusion is new and associated with minimal FDG avidity, SUV max 1.7.  12/10/24 Mr. Garner presents today for consultation for radiation as referred by Dr. Gerardo. They discussed carboplatin/paclitaxel concurrently with radiation. Patient reports of fatigue, hiccups, occasional loss of voice & hoarseness, and left chest discomfort/pressure. He denies any chest pain, SOB, difficulty swallowing. He started drinking boost as recommended by Dr. Gerardo, he is able to tolerate food without difficulty.  12/27/2024 Mr. Garenr presents today for his OTV, completed 5/23 fractions to the esophagus/LN to a dose of 900 cGy. He states that he is overall feeling well. He is able to eat mostly any foods, spicy is a bit more difficult. HE eats in smaller pieces. He denies any pain or discomfort. He does have some fatigue; He has received 2 cycles of chemotherapy with Dr. Gerardo. He feels that he is tolerating the treatments well. He is going to see the Dietician, mitali Mccloud for some additional information with eating. We discussed some skin care in case of any irritation at the treatment site.  12/31/24 FX 7 of 28 Doing well with RT. he reports that his third cycle of Taxol was held today DT an ANC of 800. Dr Gerardo was aware but said to proceed with his RT. he will have another CBC done on 1/2/24. He is eating very well and taking 2 boost drinks per day. He did loose 7 Lbs since last week.  1/7/24 FX 11 Doing very well with TX. Able to eat all solid foods. He will see Dr Gerardo for labs and f/u 1/9. He will bring us a copy of his cbc.  1/14/2025 Mr. Garner presents today for his OTV, completed 16/23 fractions to the esophagus/LN to a dose of 2880 cGy. He states that he has a slight burning sensation at the base of his esophagus but that he is eating mainly bland foods and nothing spicy. He has 3 Boost a day. He noticed an increase in his fatigue this weekend.  1/21/2025 Mr. Garner presents today for his OTV, completed 20/23 fractions to the esophagus/LN to a dose of 3600 cGy. He states that he has a burning sensation at the base of his esophagus which is occurring now when he eats most food. He has 3 Boost a day. He is having ice cream and taking in a good number of calories. He will eat food when he can tolerate it. He met with Ame Ahumada, the Dietician last week. He had to hold his chemotherapy last week due to counts being low. He is feeling tired.

## 2025-01-22 NOTE — PHYSICAL EXAM
"Pt asked to speak with writer and states \"something's going on\" and continues to say that \"I feel antsy, something isn't right, I feel like I I might be going into a manic episode\". Pt states he feels like he wants to clean everything and do a lot of things. Pt states he is feeling very anxious and feeling somewhat dizzy. VSS. Writer contacted on-call provider for advisement due to pt being in the midst of several medication titers and unsure whether this could be a medication SE. On call provider advised to give pt PRN ativan 1 mg and if pt is still feeling \"off\" to give PRN zyprexa 5 mg. Pt was agreeable to this plan and was given PRN ativan at 1700. Pt then stated \"maybe I am just hungry\". Pt was given orange juice incase blood sugar could be low. Will continue to monitor. BG after orange juice was 110. Pt states ativan was helpful with the antsy/restless/manic feelings he was having. Pt now reporting tremors/quivers in the muscles of his arms, shoulders and back and feels \"kind of like I drank too much espresso\". Suspect EPSE r/t new abilify. On call provider paged for advisement. On call provider advised ativan will be helpful and nothing else is needed. Pt ate dinner. After dinner pt states he is feeling slightly better, but still \"jittery\". Pt was given PRN benadryl 25 mg and states this was helpful with the tremors and he felt better. Will continue to monitor.     Pt had a visit with his wife this evening which went well. Pt denies SI/SIB/HI. Pt states that he is feeling safe here and attributes this to being away from his stressors. Writer spoke with pt and his wife during visit explaining coordination of care to his wife. They talked about  Their 2 teenage children who are diagnosed with PANDAS which is very stressful for them at home. Pt states one of his children is also in transition from female to male and this is very hard for him to deal with and his parents have not been very accepting of his child " for this reason. Pt states he is dealing with a lot of guilt and shame around all of these stressors. We discussed some healthy ways that he could cope with these stressors stressors such as journaling or speaking to his therapist about all this. pt verbalized understanding. Pt is compliant with all scheduled medications.        [Normal] : oriented to person, place and time, the affect was normal, the mood was normal and not anxious

## 2025-01-22 NOTE — PHYSICAL EXAM
[Normal] : oriented to person, place and time, the affect was normal, the mood was normal and not anxious no abnormal vaginal bleeding/no pelvic pain

## 2025-01-22 NOTE — DISEASE MANAGEMENT
[Clinical] : TNM Stage: c [III] : III [TTNM] : 3 [NTNM] : 1 [MTNM] : 0 [de-identified] : [id number] [[place] [id number]] : [id number] [de-identified] : completed 16/23 fractions to the esophagus/LN to a dose of 2880 cGy

## 2025-01-22 NOTE — DISEASE MANAGEMENT
[Clinical] : TNM Stage: c [III] : III [TTNM] : 3 [NTNM] : 1 [MTNM] : 0 [de-identified] : [id number] [[place] [id number]] : [id number] [de-identified] : completed 16/23 fractions to the esophagus/LN to a dose of 2880 cGy

## 2025-01-22 NOTE — HISTORY OF PRESENT ILLNESS
[FreeTextEntry1] : Mr. 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  He initially presented with unexpected weight loss associated with a poor appetite.  10/24/24 CT Abdomen/Pelvis ([facility]) Impression: 1. Moderate hiatal hernia. Prominent incompletely imaged thickening of the distal esophagus and hiatal hernia. Recommend correlation with upper endoscopy if not previously assessed. 2. S/p left nephrectomy. Cholelithiasis. No lymphadenopathy or ascites.  11/19/24 EGD with Dr. Bains showed a circumferential, friable, stenotic mass lesion extending from mid esophagus at 28cm to the EG Junction at 40cm. Pathology revealed an invasive poorly differentiated adenocarcinoma, with signet cell and mucinous features. The depth of invasion cannot be evaluated d/t the fragmentation and superficial nature of the specimens. MMR Intact. He also had a colonoscopy done at the same time showing tubular adenoma. Colonic mucosa, negative for dysplasia. There is no evidence of high-grade dysplasia or malignancy.  CA-19-9: 301 U/ml CEA: 16.6 ng/ml  11/26/24 CT Abdomen/Pelvis ([facility]) showed a moderate amount of stool in the colon. Hiatal hernia again noted. Prominent thickening of the visualized esophagus again noted. Exophytic component of the mass versus enlarged paraesophageal lymph node measuring ~ 1.9 x 1.3cm. - No evidence of acute inflammatory process in the abdomen or pelvis. - s/p left nephrectomy.  11/28/24 CT C/A/P ([facility]) Impression: - Mass-like thickening of the distal esophagus compatible with primary malignancy. - There is a 2cm right paraesophageal lymph node. - Multiple tiny pulmonary nodules. Although they dont have suspicious features, they are technically indeterminate because of their multiplicity and the patient's history. They are too small to evaluate via PET-CT. Can consider close interval follow-up.  12/4/24 PET/CT ([facility]) 1. Intense increased FDG avidity is associated with a segment of diffuse thickening of the mid and distal esophagus, SUV max 4.6 2. A 2cm paraesophageal node, has mild FDG avidity, SUV max 2.5. Another probable paraesophageal nodule at the level of the diaphragm measure 1.8 x 1.4cm, SUV max 2.7. 3. No FDG-avid pulmonary nodule. Previously seen small pulmonary nodules are below the resolution of PET-CT scan. A trace right pleural effusion is new and associated with minimal FDG avidity, SUV max 1.7.  12/10/24 Mr. Garner presents today for consultation for radiation as referred by Dr. Gerardo. They discussed carboplatin/paclitaxel concurrently with radiation. Patient reports of fatigue, hiccups, occasional loss of voice & hoarseness, and left chest discomfort/pressure. He denies any chest pain, SOB, difficulty swallowing. He started drinking boost as recommended by Dr. Gerardo, he is able to tolerate food without difficulty.  12/27/2024 Mr. Garner presents today for his OTV, completed 5/23 fractions to the esophagus/LN to a dose of 900 cGy. He states that he is overall feeling well. He is able to eat mostly any foods, spicy is a bit more difficult. HE eats in smaller pieces. He denies any pain or discomfort. He does have some fatigue; He has received 2 cycles of chemotherapy with Dr. Gerardo. He feels that he is tolerating the treatments well. He is going to see the Dietician, mitali Mccloud for some additional information with eating. We discussed some skin care in case of any irritation at the treatment site.  12/31/24 FX 7 of 28 Doing well with RT. he reports that his third cycle of Taxol was held today DT an ANC of 800. Dr Gerardo was aware but said to proceed with his RT. he will have another CBC done on 1/2/24. He is eating very well and taking 2 boost drinks per day. He did loose 7 Lbs since last week.  1/7/24 FX 11 Doing very well with TX. Able to eat all solid foods. He will see Dr Gerardo for labs and f/u 1/9. He will bring us a copy of his cbc.  1/14/2025 Mr. Garner presents today for his OTV, completed 16/23 fractions to the esophagus/LN to a dose of 2880 cGy. He states that he has a slight burning sensation at the base of his esophagus but that he is eating mainly bland foods and nothing spicy. He has 3 Boost a day. He noticed an increase in his fatigue this weekend.  1/21/2025 Mr. Garner presents today for his OTV, completed 20/23 fractions to the esophagus/LN to a dose of 3600 cGy. He states that he has a burning sensation at the base of his esophagus which is occurring now when he eats most food. He has 3 Boost a day. He is having ice cream and taking in a good number of calories. He will eat food when he can tolerate it. He met with Ame Ahumada, the Dietician last week. He had to hold his chemotherapy last week due to counts being low. He is feeling tired.

## 2025-02-07 NOTE — HISTORY OF PRESENT ILLNESS
[Home] : at home, [unfilled] , at the time of the visit. [Medical Office: ([facility])___] : at the medical office located in  [FreeTextEntry1] : Patient is a 73-year-old M, nonsmoker, with PMHx of HTN, HLD, T2DM, PE, Afib on Eliquis, renal insufficiency, kidney donor (in 1986). He was referred by radiation oncology Dr. 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  He is planned for preoperative chemoRT (carboplatin and paclitaxel weekly + 4140cGy x 5 weeks) followed by surgical resection for durable local control and improved overall survival. If for whatever the reason, he is not a candidate for surgery, will plan on converting his treatment strategy to definitive chemoradiation and increasing the radiation dose to 5040 cGy in 28 fractions along with concomitant chemotherapy.   Patient reports of fatigue, hiccups, occasional loss of voice & hoarseness, and left chest discomfort/pressure. He denies any chest pain, SOB, difficulty swallowing.   He had the 1st cycle of chemotherapy on 12/17/2024 and began radiation treatment on 12/20/2024. As of 01/21/25 he completed 20/23 fractions to the esophagus/LN to a dose of 3600 cGy. He has been evaluated by a dietician and continues Boost supplements 3x/day. He complains of a burning sensation at the base of his esophagus when eating most foods.   He presents today via telehealth for a follow-up visit to discuss plan of care and surgery.

## 2025-02-07 NOTE — HISTORY OF PRESENT ILLNESS
[Home] : at home, [unfilled] , at the time of the visit. [Medical Office: ([facility])___] : at the medical office located in  [FreeTextEntry1] : Patient is a 73-year-old M, nonsmoker, with PMHx of HTN, HLD, T2DM, PE, Afib on Eliquis, renal insufficiency, kidney donor (in 1986). He was referred by radiation oncology Dr. 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  He is planned for preoperative chemoRT (carboplatin and paclitaxel weekly + 4140cGy x 5 weeks) followed by surgical resection for durable local control and improved overall survival. If for whatever the reason, he is not a candidate for surgery, will plan on converting his treatment strategy to definitive chemoradiation and increasing the radiation dose to 5040 cGy in 28 fractions along with concomitant chemotherapy.   Patient reports of fatigue, hiccups, occasional loss of voice & hoarseness, and left chest discomfort/pressure. He denies any chest pain, SOB, difficulty swallowing.   He had the 1st cycle of chemotherapy on 12/17/2024 and began radiation treatment on 12/20/2024. As of 01/21/25 he completed 20/23 fractions to the esophagus/LN to a dose of 3600 cGy. He has been evaluated by a dietician and continues Boost supplements 3x/day. He complains of a burning sensation at the base of his esophagus when eating most foods.   He presents today via telehealth for a follow-up visit to discuss plan of care and surgery.

## 2025-02-07 NOTE — ASSESSMENT
[FreeTextEntry1] : 73-year-old M, nonsmoker, with PMHx of HTN, HLD, T2DM, PE, Afib on Eliquis, renal insufficiency, kidney donor (in 1986). He was referred by radiation oncology Dr. 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  He is planned for preoperative chemoRT (carboplatin and paclitaxel weekly + 4140cGy x 5 weeks) followed by surgical resection for durable local control and improved overall survival. If for whatever the reason, he is not a candidate for surgery, will plan on converting his treatment strategy to definitive chemoradiation and increasing the radiation dose to 5040 cGy in 28 fractions along with concomitant chemotherapy.   Patient reports of fatigue, hiccups, occasional loss of voice & hoarseness, and left chest discomfort/pressure. He denies any chest pain, SOB, difficulty swallowing.   He had the 1st cycle of chemotherapy on 12/17/2024 and began radiation treatment on 12/20/2024. As of 01/21/25 he completed 20/23 fractions to the esophagus/LN to a dose of 3600 cGy. He has been evaluated by a dietician and continues Boost supplements 3x/day. He complains of a burning sensation at the base of his esophagus when eating most foods.   He presents today via telehealth for a follow-up visit to discuss plan of care and surgery.   He has completed chemoradiation 2 weeks ago and has some burning after eating. He has also lost some weight and is Ensure to gain weight back prior to surgery scheduled the first week of March (3/3/2025).   The risks and benefits of EGD, Robotic Assisted Three-Hole Esophagectomy, J-tube, Possible Laparotomy Possible Thoracotomy, including but not limited to risks of infection, bleeding, pneumonias, thromboembolic complications, arrhythmias myocardial infarction, need for open procedure, as well as the risks of anesthesia. Specific risks discussed included chyle leak, left recurrent laryngeal nerve injury requiring intervention, anastomotic leak requiring intervention with prolonged nothing per mouth (NPO) status, respiratory failure requiring tracheostomy tracheal fistula, short and long term diet modifications, death (5<%) as well as recurrence. A talya discussion of the procedure was performed and all questions were answered.  He wishes to proceed. He will need PFTs, ECHO, and a cardiology clearance prior to surgery. He will also need a restaging PET-CT as well as see Dr. Mirza.  Plan: PET-CT PFTs ECHO Cardiology Consult EGD, Robotic Assisted Three-Hole Esophagectomy, J-tube, Possible Laparotomy Possible Thoracotomy (3/3/2025)  I, Dr. Sherine Howell MD, personally performed the evaluation and management (E/M) services for this established patient who presents today with (a) new problem(s)/exacerbation of (an) existing condition(s).  That E/M includes conducting the clinically appropriate interval history &/or exam, assessing all new/exacerbated conditions, and establishing a new plan of care. I have also independently reviewed the medical records and imaging at the time of this office visit, and discussed the above mentioned images with interpretations with the patient. Today, my ERIC was here to observe &/or participate in the visit & follow plan of care established by me.  Total time of 20 minutes with > 50% spent with the patient discussing radiologic studies, diagnosis, treatment options, and risks and benefits of surgery.

## 2025-02-07 NOTE — DATA REVIEWED
[FreeTextEntry1] : 12/4/24 PET/CT ([facility]) 1. Intense increased FDG avidity is associated with a segment of diffuse thickening of the mid and distal esophagus, SUV max 4.6 2. A 2cm paraesophageal node, has mild FDG avidity, SUV max 2.5. Another probable paraesophageal nodule at the level of the diaphragm measure 1.8 x 1.4cm, SUV max 2.7. 3. No FDG-avid pulmonary nodule. Previously seen small pulmonary nodules are below the resolution of PET-CT scan. A trace right pleural effusion is new and associated with minimal FDG avidity, SUV max 1.7.  11/28/24 CT C/A/P ([facility]) Impression: - Mass-like thickening of the distal esophagus compatible with primary malignancy. - There is a 2cm right paraesophageal lymph node. - Multiple tiny pulmonary nodules. Although they dont have suspicious features, they are technically indeterminate because of their multiplicity and the patient's history. They are too small to evaluate via PET-CT. Can consider close interval follow-up.  11/26/24 CT Abdomen/Pelvis ([facility]) showed a moderate amount of stool in the colon. Hiatal hernia again noted. Prominent thickening of the visualized esophagus again noted. Exophytic component of the mass versus enlarged paraesophageal lymph node measuring ~ 1.9 x 1.3cm. - No evidence of acute inflammatory process in the abdomen or pelvis. - s/p left nephrectomy.  11/19/24 EGD with Dr. Bains showed a circumferential, friable, stenotic mass lesion extending from mid esophagus at 28cm to the EG Junction at 40cm. Pathology revealed an invasive poorly differentiated adenocarcinoma, with signet cell and mucinous features. The depth of invasion cannot be evaluated d/t the fragmentation and superficial nature of the specimens. MMR Intact. He also had a colonoscopy done at the same time showing tubular adenoma. Colonic mucosa, negative for dysplasia. There is no evidence of high-grade dysplasia or malignancy.  10/24/24 CT Abdomen/Pelvis ([facility]) Impression: 1. Moderate hiatal hernia. Prominent incompletely imaged thickening of the distal esophagus and hiatal hernia. Recommend correlation with upper endoscopy if not previously assessed. 2. S/p left nephrectomy. Cholelithiasis. No lymphadenopathy or ascites.

## 2025-02-18 NOTE — DISEASE MANAGEMENT
[TTNM] : 3 [NTNM] : 1 [MTNM] : 0 [de-identified] : [id number] [[place] [id number]] : [id number] [de-identified] : esophagus/LN completed on 01/24/25

## 2025-02-18 NOTE — PHYSICAL EXAM
[Hearing Threshold Finger Rub Not Obion] : hearing was normal [] : no respiratory distress [Respiration, Rhythm And Depth] : normal respiratory rhythm and effort [Exaggerated Use Of Accessory Muscles For Inspiration] : no accessory muscle use [Heart Rate And Rhythm] : heart rate and rhythm were normal [Arterial Pulses Normal] : the arterial pulses were normal [Abdomen Soft] : soft [Nondistended] : nondistended [Cervical Lymph Nodes Enlarged Posterior Bilaterally] : posterior cervical [Cervical Lymph Nodes Enlarged Anterior Bilaterally] : anterior cervical [Supraclavicular Lymph Nodes Enlarged Bilaterally] : supraclavicular [Motor Tone] : muscle strength and tone were normal [Normal] : oriented to person, place and time, the affect was normal, the mood was normal and not anxious

## 2025-02-18 NOTE — PHYSICAL EXAM
[Hearing Threshold Finger Rub Not [place]] : hearing was normal [] : no respiratory distress [Respiration, Rhythm And Depth] : normal respiratory rhythm and effort [Exaggerated Use Of Accessory Muscles For Inspiration] : no accessory muscle use [Heart Rate And Rhythm] : heart rate and rhythm were normal [Arterial Pulses Normal] : the arterial pulses were normal [Abdomen Soft] : soft [Nondistended] : nondistended [Cervical Lymph Nodes Enlarged Posterior Bilaterally] : posterior cervical [Cervical Lymph Nodes Enlarged Anterior Bilaterally] : anterior cervical [Supraclavicular Lymph Nodes Enlarged Bilaterally] : supraclavicular [Motor Tone] : muscle strength and tone were normal [Normal] : oriented to person, place and time, the affect was normal, the mood was normal and not anxious

## 2025-02-18 NOTE — REVIEW OF SYSTEMS
[Fatigue] : fatigue [Vomiting] : vomiting [Disturbance Of Gait] : gait disturbance [Easy Bruising] : a tendency for easy bruising [Negative] : Endocrine [Constipation: Grade 0] : Constipation: Grade 0 [Diarrhea: Grade 0] : Diarrhea: Grade 0 [Dysphagia: Grade 0] : Dysphagia: Grade 0 [Nausea: Grade 0] : Nausea: Grade 0 [Vomiting: Grade 0] : Vomiting: Grade 0 [Fatigue: Grade 1 - Fatigue relieved by rest] : Fatigue: Grade 1 - Fatigue relieved by rest [Cough: Grade 0] : Cough: Grade 0 [Dyspnea: Grade 0] : Dyspnea: Grade 0 [Hiccups: Grade 0] : Hiccups: Grade 0 [Hoarseness: Grade 1 - Mild or intermittent voice change; fully understandable; self-resolves] : Hoarseness: Grade 1 - Mild or intermittent voice change; fully understandable; self-resolves [Alopecia: Grade 2 - Hair loss of >=50% normal for that individual that is readily apparent to others; a wig or hair piece is necessary if the patient desires to completely camouflage the hair loss; associated with psychosocial impact] : Alopecia: Grade 2 - Hair loss of >=50% normal for that individual that is readily apparent to others; a wig or hair piece is necessary if the patient desires to completely camouflage the hair loss; associated with psychosocial impact [Chest Pain] : no chest pain [Shortness Of Breath] : no shortness of breath [Cough] : no cough [SOB on Exertion] : no shortness of breath during exertion [Abdominal Pain] : no abdominal pain [FreeTextEntry7] : heartburn

## 2025-02-18 NOTE — DISEASE MANAGEMENT
[TTNM] : 3 [NTNM] : 1 [MTNM] : 0 [de-identified] : [id number] [[place] [address]] : 2009 [de-identified] : esophagus/LN completed on 01/24/25

## 2025-02-18 NOTE — HISTORY OF PRESENT ILLNESS
[FreeTextEntry1] : Mr. Garner is a 73-year-old male with newly diagnosed invasive poorly differentiated adenocarcinoma of the esophagu - mid esophagus extending to the GE junction, clinical stage T3 N1 M0, PET showed a paraesophageal node mild FDG avidity. He is now s/p 23 fractions of RT to the esophagus/LN to a dose of 4140 cGy completed on 01/24/25.  He initially presented with unexpected weight loss associated with a poor appetite.    10/24/24 CT Abdomen/Pelvis ([facility])  Impression:  1. Moderate hiatal hernia. Prominent incompletely imaged thickening of the distal esophagus and hiatal hernia. Recommend correlation with upper endoscopy if not previously assessed.  2. S/p left nephrectomy. Cholelithiasis. No lymphadenopathy or ascites.   11/19/24 EGD with Dr. Bains showed a circumferential, friable, stenotic mass lesion extending from mid esophagus at 28cm to the EG Junction at 40cm. Pathology revealed an invasive poorly differentiated adenocarcinoma, with signet cell and mucinous features. The depth of invasion cannot be evaluated d/t the fragmentation and superficial nature of the specimens. MMR Intact.  He also had a colonoscopy done at the same time showing tubular adenoma. Colonic mucosa, negative for dysplasia. There is no evidence of high-grade dysplasia or malignancy.  CA-19-9: 301 U/ml  CEA: 16.6 ng/ml   11/26/24 CT Abdomen/Pelvis ([facility]) showed a moderate amount of stool in the colon. Hiatal hernia again noted. Prominent thickening of the visualized esophagus again noted. Exophytic component of the mass versus enlarged paraesophageal lymph node measuring ~ 1.9 x 1.3cm.  - No evidence of acute inflammatory process in the abdomen or pelvis.  - s/p left nephrectomy.   11/28/24 CT C/A/P ([facility])  Impression:  - Mass-like thickening of the distal esophagus compatible with primary malignancy.  - There is a 2cm right paraesophageal lymph node.  - Multiple tiny pulmonary nodules. Although they dont have suspicious features, they are technically indeterminate because of their multiplicity and the patient's history. They are too small to evaluate via PET-CT. Can consider close interval follow-up.   12/4/24 PET/CT ([facility])  1. Intense increased FDG avidity is associated with a segment of diffuse thickening of the mid and distal esophagus, SUV max 4.6  2. A 2cm paraesophageal node, has mild FDG avidity, SUV max 2.5. Another probable paraesophageal nodule at the level of the diaphragm measure 1.8 x 1.4cm, SUV max 2.7.  3. No FDG-avid pulmonary nodule. Previously seen small pulmonary nodules are below the resolution of PET-CT scan. A trace right pleural effusion is new and associated with minimal FDG avidity, SUV max 1.7.   12/10/24 Mr. Garner presents today for consultation for radiation as referred by Dr. Gerardo. They discussed carboplatin/paclitaxel concurrently with radiation. Patient reports of fatigue, hiccups, occasional loss of voice & hoarseness, and left chest discomfort/pressure. He denies any chest pain, SOB, difficulty swallowing. He started drinking boost as recommended by Dr. Gerardo, he is able to tolerate food without difficulty.  02/18/25 Mr. Lala presents today for PTE s/p 23 fractions of RT to the esophagus/LN to a dose of 4140 cGy. Patient reports doing well since finishing chemoRT. He denies any chest pain, SOB, and difficulty swallowing. He however had an incident of vomiting which happened once, took Zofran with relief. He also reports of occasional burning sensation to his stomach and persistent hoarseness of his voice. Patient has been tolerating liquids and soft food well, no changes to his taste. He drinks Very High Calories boost TID. He saw Dr. Howell on 2/7/25 and decided to proceed with surgery scheduled for 3/3/25. PET/CT is scheduled for tomorrow. He has a follow-up with Dr. Mirza next week.

## 2025-03-04 NOTE — ASSESSMENT
[FreeTextEntry1] : 74-year-old male with esophageal cancer midesophagus.  The patient underwent chemoradiation.  The patient did well.  The patient is scheduled for robotic assisted 3-hole esophagectomy at [facility] on Monday.  The patient has seen Dr. Howell.  The patient will follow-up with me.  I will be in the surgery as an assistant.  All questions were answered.

## 2025-03-04 NOTE — DATA REVIEWED
[FreeTextEntry1] : PET/CT 2/19/2025 Patient Name: TANNA PINTO Report Date: 21-Feb-2025 10:00.00 Patient ID: [id number] ([id number]), [id number] (EPI) Accession No.: [id number] Patient Birth Date: 04-Mar-1951 Report Status: F Referring Physician: [id number] NICOLE PRICE Reason For Study: pre op rule out mets; ACC: [id number] EXAM: PETCT [facility] FDG SUBS ORDERED BY: DEE RIVERA PROCEDURE DATE: 02/19/2025 INTERPRETATION: CLINICAL INFORMATION: Esophageal cancer. Status post chemotherapy and radiation. Preoperative evaluation. TREATMENT STRATEGY EVALUATION: Subsequent AREA IMAGED: Skull base-to-thigh FASTING BLOOD SUGAR: 134 mg/dl RADIOPHARMACEUTICAL: 11.94 mCi F-18 FDG, I.V. I.V. SITE: Left forearm UPTAKE PERIOD: 60 min SCANNER: Siemens Biograph mCT ORAL CONTRAST: None PHARMACOLOGIC INTERVENTION: None. TECHNIQUE: Following intravenous injection of above radiopharmaceutical and uptake period, PET/CT was performed. CT protocol was optimized for PET attenuation correction and anatomic localization and was not designed to produce and cannot replace state-of-the-art diagnostic CT images with specific imaging protocols for different body parts and indications. Images were reconstructed and reviewed in axial, coronal and sagittal views and three-dimensional MIP. The standardized uptake values (SUV) are normalized to patient body weight and indicate the highest activity concentration (SUVmax) in a given site. All image numbers refer to axial image number. COMPARISON: Prior PET scan from 12/4/2024. OTHER STUDIES USED FOR CORRELATION: None FINDINGS: HEAD/NECK: Physiologic FDG activity in visualized brain, head, and neck. THORAX: No abnormal FDG activity. Lymph nodes identified on the patient's prior CT scan from 11/27/2024 are still present although more difficult to identify on this noncontrast study. No abnormal FDG uptake is seen in this area. Review of the prior PET scan dated 12/4/2024 suggests that uptake thought to be in these lymph nodes is actually in the adjacent esophagus. LUNGS: No abnormal FDG activity. Previously described micronodules are less easily visualized on this study. PLEURA/PERICARDIUM: No abnormal FDG activity. No effusion. HEPATOBILIARY/PANCREAS: Physiologic FDG activity. For reference, normal liver demonstrates SUV mean 3.2. SPLEEN: Physiologic FDG activity. Normal in size. ADRENAL GLANDS: No abnormal FDG activity. No nodule. KIDNEYS/URINARY BLADDER: Physiologic excreted FDG activity. Status post left nephrectomy. REPRODUCTIVE ORGANS: No abnormal FDG activity. ABDOMINOPELVIC LYMPH NODES/RETROPERITONEUM: No enlarged or FDG-avid lymph node. ESOPHAGUS/STOMACH/BOWEL/PERITONEUM/MESENTERY: Intense activity is seen along the entire lower portion of the esophagus. The overall extent of activity is about 11.4 cm. The greatest intensity of uptake is seen near the proximal end of the lesion where SUV is as high as 8.1. More distally, activity reaches an SUV max of at least 7.1. Comparison with prior study shows that maximum SUVs have actually increased since that time, probably from radiation. No other abnormal FDG activity. VESSELS: Unremarkable. BONES/SOFT TISSUES: No abnormal FDG activity. IMPRESSION: 1. Intense activity in the distal esophagus. The overall extent of activity is similar to that seen on the prior study but maximum SUV has actually increased, probably related to radiation therapy. 2. Previously identified lymph nodes show no significant FDG uptake, but review of the prior PET scan suggests that there may not have been significant activity prior to treatment. 3. No abnormal FDG uptake is seen in the chest despite the presence of multiple micronodules. 4. Patient is status post left nephrectomy (kidney donor). Pathology reportedly shows an invasive poorly differentiated adenocarcinoma with signet ring cell and mucinous features. It should be noted that both signet ring cell and mucinous tumors may have limited FDG affinity. In some cases, FDG uptake may be related to an inflammatory reaction rather than tumor uptake suggesting that the identified lymph nodes on the CT scan may be falsely negative. This phenomenon may explain the unusual increase in FDG uptake after treatment. For that reason, if lymph nodes are positive at the time of surgery, caution should be exercised when relying on FDG PET scanning for follow-up studies. --- End of Report ---

## 2025-03-04 NOTE — HISTORY OF PRESENT ILLNESS
[FreeTextEntry1] :  Patient is a 73-year-old M, nonsmoker, with PMHx of HTN, HLD, T2DM, PE, Afib on Eliquis, renal insufficiency, kidney donor (in 1986). He was referred by radiation oncology Dr. 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 Patient was seen by Dr. Howell on 2/7/2025 where he indicated his plan for EGD, Robotic Assisted Three-Hole Esophagectomy, J-tube, Possible Laparotomy Possible Thoracotomy at [facility] on 3/3/2025 with Dr. Rojas and Dr. Mirza as co-surgeon. PET/CT completed at [facility] on 2/19/2025. Patient now present for evaluation with Dr. Mirza.

## 2025-03-04 NOTE — HISTORY OF PRESENT ILLNESS
[FreeTextEntry1] :  Patient is a 73-year-old M, nonsmoker, with PMHx of HTN, HLD, T2DM, PE, Afib on Eliquis, renal insufficiency, kidney donor (in 1986). He was referred by radiation oncology Dr. 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 Patient was seen by Dr. Howell on 2/7/2025 where he indicated his plan for EGD, Robotic Assisted Three-Hole Esophagectomy, J-tube, Possible Laparotomy Possible Thoracotomy at [facility] on 3/3/2025 with Dr. Rojas and Dr. Mirza as co-surgeon. PET/CT completed at [facility] on 2/19/2025. Patient now present for evaluation with Dr. Mirza.

## 2025-03-04 NOTE — DATA REVIEWED
[FreeTextEntry1] : PET/CT 2/19/2025 Patient Name: TANNA PINTO Report Date: 21-Feb-2025 10:00.00 Patient ID: [id number] ([id number]), [id number] (EPI) Accession No.: [id number] Patient Birth Date: 04-Mar-1951 Report Status: F Referring Physician: [id number] NICOLE PRICE Reason For Study: pre op rule out mets; ACC: [id number] EXAM: PETCT [facility] FDG SUBS ORDERED BY: DEE RIVERA PROCEDURE DATE: 02/19/2025 INTERPRETATION: CLINICAL INFORMATION: Esophageal cancer. Status post chemotherapy and radiation. Preoperative evaluation. TREATMENT STRATEGY EVALUATION: Subsequent AREA IMAGED: Skull base-to-thigh FASTING BLOOD SUGAR: 134 mg/dl RADIOPHARMACEUTICAL: 11.94 mCi F-18 FDG, I.V. I.V. SITE: Left forearm UPTAKE PERIOD: 60 min SCANNER: Siemens Biograph mCT ORAL CONTRAST: None PHARMACOLOGIC INTERVENTION: None. TECHNIQUE: Following intravenous injection of above radiopharmaceutical and uptake period, PET/CT was performed. CT protocol was optimized for PET attenuation correction and anatomic localization and was not designed to produce and cannot replace state-of-the-art diagnostic CT images with specific imaging protocols for different body parts and indications. Images were reconstructed and reviewed in axial, coronal and sagittal views and three-dimensional MIP. The standardized uptake values (SUV) are normalized to patient body weight and indicate the highest activity concentration (SUVmax) in a given site. All image numbers refer to axial image number. COMPARISON: Prior PET scan from 12/4/2024. OTHER STUDIES USED FOR CORRELATION: None FINDINGS: HEAD/NECK: Physiologic FDG activity in visualized brain, head, and neck. THORAX: No abnormal FDG activity. Lymph nodes identified on the patient's prior CT scan from 11/27/2024 are still present although more difficult to identify on this noncontrast study. No abnormal FDG uptake is seen in this area. Review of the prior PET scan dated 12/4/2024 suggests that uptake thought to be in these lymph nodes is actually in the adjacent esophagus. LUNGS: No abnormal FDG activity. Previously described micronodules are less easily visualized on this study. PLEURA/PERICARDIUM: No abnormal FDG activity. No effusion. HEPATOBILIARY/PANCREAS: Physiologic FDG activity. For reference, normal liver demonstrates SUV mean 3.2. SPLEEN: Physiologic FDG activity. Normal in size. ADRENAL GLANDS: No abnormal FDG activity. No nodule. KIDNEYS/URINARY BLADDER: Physiologic excreted FDG activity. Status post left nephrectomy. REPRODUCTIVE ORGANS: No abnormal FDG activity. ABDOMINOPELVIC LYMPH NODES/RETROPERITONEUM: No enlarged or FDG-avid lymph node. ESOPHAGUS/STOMACH/BOWEL/PERITONEUM/MESENTERY: Intense activity is seen along the entire lower portion of the esophagus. The overall extent of activity is about 11.4 cm. The greatest intensity of uptake is seen near the proximal end of the lesion where SUV is as high as 8.1. More distally, activity reaches an SUV max of at least 7.1. Comparison with prior study shows that maximum SUVs have actually increased since that time, probably from radiation. No other abnormal FDG activity. VESSELS: Unremarkable. BONES/SOFT TISSUES: No abnormal FDG activity. IMPRESSION: 1. Intense activity in the distal esophagus. The overall extent of activity is similar to that seen on the prior study but maximum SUV has actually increased, probably related to radiation therapy. 2. Previously identified lymph nodes show no significant FDG uptake, but review of the prior PET scan suggests that there may not have been significant activity prior to treatment. 3. No abnormal FDG uptake is seen in the chest despite the presence of multiple micronodules. 4. Patient is status post left nephrectomy (kidney donor). Pathology reportedly shows an invasive poorly differentiated adenocarcinoma with signet ring cell and mucinous features. It should be noted that both signet ring cell and mucinous tumors may have limited FDG affinity. In some cases, FDG uptake may be related to an inflammatory reaction rather than tumor uptake suggesting that the identified lymph nodes on the CT scan may be falsely negative. This phenomenon may explain the unusual increase in FDG uptake after treatment. For that reason, if lymph nodes are positive at the time of surgery, caution should be exercised when relying on FDG PET scanning for follow-up studies. --- End of Report ---

## 2025-03-04 NOTE — PHYSICAL EXAM
[General Appearance - Alert] : alert [General Appearance - In No Acute Distress] : in no acute distress [Sclera] : the sclera and conjunctiva were normal [PERRL With Normal Accommodation] : pupils were equal in size, round, and reactive to light [Extraocular Movements] : extraocular movements were intact [Outer Ear] : the ears and nose were normal in appearance [Oropharynx] : the oropharynx was normal [Neck Appearance] : the appearance of the neck was normal [Neck Cervical Mass (___cm)] : no neck mass was observed [Jugular Venous Distention Increased] : there was no jugular-venous distention [Thyroid Diffuse Enlargement] : the thyroid was not enlarged [Thyroid Nodule] : there were no palpable thyroid nodules [Auscultation Breath Sounds / Voice Sounds] : lungs were clear to auscultation bilaterally [Heart Rate And Rhythm] : heart rate was normal and rhythm regular [Heart Sounds] : normal S1 and S2 [Heart Sounds Gallop] : no gallops [Murmurs] : no murmurs [Heart Sounds Pericardial Friction Rub] : no pericardial rub [Examination Of The Chest] : the chest was normal in appearance [Chest Visual Inspection Thoracic Asymmetry] : no chest asymmetry [Diminished Respiratory Excursion] : normal chest expansion [Bowel Sounds] : normal bowel sounds [Abdomen Soft] : soft [Abdomen Tenderness] : non-tender [] : no hepato-splenomegaly [Abdomen Mass (___ Cm)] : no abdominal mass palpated

## 2025-03-04 NOTE — ASSESSMENT
[FreeTextEntry1] : 74-year-old male with esophageal cancer midesophagus.  The patient underwent chemoradiation.  The patient did well.  The patient is scheduled for robotic assisted 3-hole esophagectomy at [facility] on Monday.  The patient has seen Dr. Howell.  The patient will follow-up with me.  I will be in the surgery as an assistant.  All questions were answered.

## 2025-03-24 NOTE — ASSESSMENT
[FreeTextEntry1] : 72 y/o male, nonsmoker, with PMHx of HTN, HLD, DMII, PE, Afib (on Eliquis), renal insufficiency (kidney donor in 1986). He was referred by radiation oncologist Dr. 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    Patient underwent chemotherapy and radiation starting 12/17/24 for 5 weeks.  Patient presented to [facility] on 03/03/25 and underwent a RA Arcadio Esophagectomy, pyloromyotomy and J-Tube placement.  Post-op course c/b urinary retention, orthostatic hypotension, and chyle leak. Talc pleurodesis x 2 was performed to right chest tube. On POD # 11 he underwent lymphangiography and thoracic duct embolization w/ IR. Membreno and chest tubes removed on POD # 14. He was tolerating a clear liquid diet and tube feeds were at goal. On POD # 16 Cardiology started him on Toprol 25mg and Lasix 20mg with plans to continue at home on 3/19. He was also started on a 10-day course of Keflex for cellulitis surrounding his J-tube.   On 3/22 the call center contacted the inpt team re: soft BPs (90s/50-60s). The patient was asymptomatic. He was instructed to half his Toprol dose to 12.5mg/day and to continue Lasix every other day. He was advised to take a log of his BPs.   He followed up with Dr. Mirza on 3/24 for an initial post-op visit. He presents today via telehealth for a post-op visit and to assess his recovery.   Surgical Pathology: [id number]- no residual tumor cells identified; 37 lymph nodes examined and negative.

## 2025-03-24 NOTE — REASON FOR VISIT
Duplicate encounter for Norco was refilled 10/28/21   [de-identified] : EGD with biopsies of esophageal nodules at 20 cm from the incisiors.  Robotic assisted/video-assisted thoracoscopic surgery/laparoscopic modified Arcadio 3-hole esophagectomy with pyloromyotomy.  Right robotic assisted/video-assisted thoracoscopic surgery with extensive lysis of adhesions, right lower lobe wedge, thoracic duct ligation.  Fluorescein Angiography of the Gastric Conduit with indocyanine green (ICG)  Robotic assisted/laparoscopic jejunostomy tube placement.  [de-identified] : 03/03/25 [de-identified] : 4 [de-identified] : | | Surgical Pathology:  1. Esophageal nodule biopsy at 20 cm: - Squamous mucosa negative for carcinoma.  2. Lung, right lower lobe; wedge resection: - Lung tissue negative for carcinoma. - Emphysematous changes, areas of subpleural fibrosis, and metaplastic and reactive changes.  3. Mireya-esophageal lymph node: - Fibroadipose tissue negative for carcinoma.  4. Proximal esophageal margin; excision: - Esophageal tissue negative for carcinoma.  5. Esophagus and stomach; esophagogastrectomy: - Tumor bed negative for definitive evidence of residual carcinoma - see Note and Synoptic Report. - Ulcerated tumor bed measures 11.0 cm in greatest dimension (entirely submitted for histologic evaluation). - Treatment-related changes present with focal atypical cells, favor reactive. - Numerous mucin pools present in the tumor bed and in lymph nodes, but negative for residual tumor cells. - Thirty-seven lymph nodes negative for definitive evidence of residual carcinoma (0/37)

## 2025-04-03 NOTE — ASSESSMENT
[FreeTextEntry1] : 75 y/o M now presents for second POA.   Patient intially presented on 3/24 for first POA.  HE was seen and evaluated. VSS at that time.  He was discharged from [facility] on clears and was tolerating them well.  At the last appt was instructed to advance to full liquids. He now presents stating he continues to improve and tolerate slow advancement.  HE has now been instructed to advance to soft diet.  He can increase his activity level as tolerated as well.  All questions have been answered.  Patient and family & neighbors express understanding and agree.    He will return next week for removal of J-tube.

## 2025-04-03 NOTE — ASSESSMENT
[FreeTextEntry1] : 74 y/o male, nonsmoker, with PMHx of HTN, HLD, DMII, PE, Afib (on Eliquis), renal insufficiency (kidney donor in 1986). He was referred by radiation oncologist Dr. 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    Patient underwent chemotherapy and radiation starting 12/17/24 for 5 weeks.  Patient presented to [facility] on 03/03/25 and underwent a RA Arcadio Esophagectomy, pyloromyotomy and J-Tube placement.  Post-op course c/b urinary retention, orthostatic hypotension, and chyle leak. Talc pleurodesis x 2 was performed to right chest tube. On POD # 11 he underwent lymphangiography and thoracic duct embolization w/ IR. Membreno and chest tubes removed on POD # 14. He was tolerating a clear liquid diet and tube feeds were at goal. On POD # 16 Cardiology started him on Toprol 25mg and Lasix 20mg with plans to continue at home on 3/19. He was also started on a 10-day course of Keflex for cellulitis surrounding his J-tube.   On 3/22 the call center contacted the inpt team re: soft BPs (90s/50-60s). The patient was asymptomatic. He was instructed to half his Toprol dose to 12.5mg/day and to continue Lasix every other day. He was advised to take a log of his BPs.   He followed up with Dr. Mirza on 3/24 for an initial post-op visit. He presents today via telehealth for a post-op visit and to assess his recovery.   Surgical Pathology: [id number]- no residual tumor cells identified; 37 lymph nodes examined and negative.   He feels well and has no complaints. He is tolerating clear and was advanced to a soft solid diet today. His incisions are CDI and his J-tube site erythema resolved with antibiotics. He will follow up with Dr. Mirza next week and with his oncologist.  Plan: Follow up with Dr. Mirza next week  I, Dr. Sherine Howell MD, personally performed the evaluation and management (E/M) services for this established patient who presents today with (a) new problem(s)/exacerbation of (an) existing condition(s).  That E/M includes conducting the clinically appropriate interval history &/or exam, assessing all new/exacerbated conditions, and establishing a new plan of care. I have also independently reviewed the medical records and imaging at the time of this office visit, and discussed the above mentioned images with interpretations with the patient. Today, my ERIC was here to observe &/or participate in the visit & follow plan of care established by me.  Total time of 10 minutes with > 50% spent with the patient discussing pathology and postoperative course.

## 2025-04-03 NOTE — DISCUSSION/SUMMARY
[Doing Well] : is doing well [Excellent Pain Control] : has excellent pain control [No Sign of Infection] : is showing no signs of infection [0] : 0 [Remove Sutures/Staples] : removed sutures/staples [de-identified] : remove J-tube

## 2025-04-03 NOTE — ASSESSMENT
[FreeTextEntry1] : 74 y/o male, nonsmoker, with PMHx of HTN, HLD, DMII, PE, Afib (on Eliquis), renal insufficiency (kidney donor in 1986). He was referred by radiation oncologist Dr. 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    Patient underwent chemotherapy and radiation starting 12/17/24 for 5 weeks.  Patient presented to [facility] on 03/03/25 and underwent a RA Arcadio Esophagectomy, pyloromyotomy and J-Tube placement.  Post-op course c/b urinary retention, orthostatic hypotension, and chyle leak. Talc pleurodesis x 2 was performed to right chest tube. On POD # 11 he underwent lymphangiography and thoracic duct embolization w/ IR. Membreno and chest tubes removed on POD # 14. He was tolerating a clear liquid diet and tube feeds were at goal. On POD # 16 Cardiology started him on Toprol 25mg and Lasix 20mg with plans to continue at home on 3/19. He was also started on a 10-day course of Keflex for cellulitis surrounding his J-tube.   On 3/22 the call center contacted the inpt team re: soft BPs (90s/50-60s). The patient was asymptomatic. He was instructed to half his Toprol dose to 12.5mg/day and to continue Lasix every other day. He was advised to take a log of his BPs.   He followed up with Dr. Mirza on 3/24 for an initial post-op visit. He presents today via telehealth for a post-op visit and to assess his recovery.   Surgical Pathology: [id number]- no residual tumor cells identified; 37 lymph nodes examined and negative.   He feels well and has no complaints. He is tolerating clear and was advanced to a soft solid diet today. His incisions are CDI and his J-tube site erythema resolved with antibiotics. He will follow up with Dr. Mirza next week and with his oncologist.  Plan: Follow up with Dr. Mirza next week  I, Dr. Sherine Howell MD, personally performed the evaluation and management (E/M) services for this established patient who presents today with (a) new problem(s)/exacerbation of (an) existing condition(s).  That E/M includes conducting the clinically appropriate interval history &/or exam, assessing all new/exacerbated conditions, and establishing a new plan of care. I have also independently reviewed the medical records and imaging at the time of this office visit, and discussed the above mentioned images with interpretations with the patient. Today, my ERIC was here to observe &/or participate in the visit & follow plan of care established by me.  Total time of 10 minutes with > 50% spent with the patient discussing pathology and postoperative course.

## 2025-04-03 NOTE — PHYSICAL EXAM
[] : no respiratory distress [Auscultation Breath Sounds / Voice Sounds] : lungs were clear to auscultation bilaterally [Heart Rate And Rhythm] : heart rate was normal and rhythm regular [Heart Sounds] : normal S1 and S2 [Heart Sounds Gallop] : no gallops [Murmurs] : no murmurs [Heart Sounds Pericardial Friction Rub] : no pericardial rub [Site: ___] : Site: [unfilled] [Clean] : clean [Dry] : dry [Healing Well] : healing well [Erythema] : not erythematous [Tender] : not tender [FreeTextEntry1] : j-tube site with erythema - area of concern previously has resolved

## 2025-04-03 NOTE — REASON FOR VISIT
[Home] : at home, [unfilled] , at the time of the visit. [Medical Office: ([facility])___] : at the medical office located in  [Telehealth (audio & video)] : This visit was provided via telehealth using real-time 2-way audio visual technology. [Verbal consent obtained from patient] : the patient, [unfilled] [de-identified] : EGD with biopsies of esophageal nodules at 20 cm from the incisiors.  Robotic assisted/video-assisted thoracoscopic surgery/laparoscopic modified Arcadio 3-hole esophagectomy with pyloromyotomy.  Right robotic assisted/video-assisted thoracoscopic surgery with extensive lysis of adhesions, right lower lobe wedge, thoracic duct ligation.  Fluorescein Angiography of the Gastric Conduit with indocyanine green (ICG)  Robotic assisted/laparoscopic jejunostomy tube placement.  [de-identified] : 03/03/25 [de-identified] : 4 [de-identified] : | | Surgical Pathology:  1. Esophageal nodule biopsy at 20 cm: - Squamous mucosa negative for carcinoma.  2. Lung, right lower lobe; wedge resection: - Lung tissue negative for carcinoma. - Emphysematous changes, areas of subpleural fibrosis, and metaplastic and reactive changes.  3. Mireya-esophageal lymph node: - Fibroadipose tissue negative for carcinoma.  4. Proximal esophageal margin; excision: - Esophageal tissue negative for carcinoma.  5. Esophagus and stomach; esophagogastrectomy: - Tumor bed negative for definitive evidence of residual carcinoma - see Note and Synoptic Report. - Ulcerated tumor bed measures 11.0 cm in greatest dimension (entirely submitted for histologic evaluation). - Treatment-related changes present with focal atypical cells, favor reactive. - Numerous mucin pools present in the tumor bed and in lymph nodes, but negative for residual tumor cells. - Thirty-seven lymph nodes negative for definitive evidence of residual carcinoma (0/37)

## 2025-04-03 NOTE — REASON FOR VISIT
[Home] : at home, [unfilled] , at the time of the visit. [Medical Office: ([facility])___] : at the medical office located in  [Telehealth (audio & video)] : This visit was provided via telehealth using real-time 2-way audio visual technology. [Verbal consent obtained from patient] : the patient, [unfilled] [de-identified] : EGD with biopsies of esophageal nodules at 20 cm from the incisiors.  Robotic assisted/video-assisted thoracoscopic surgery/laparoscopic modified Arcadio 3-hole esophagectomy with pyloromyotomy.  Right robotic assisted/video-assisted thoracoscopic surgery with extensive lysis of adhesions, right lower lobe wedge, thoracic duct ligation.  Fluorescein Angiography of the Gastric Conduit with indocyanine green (ICG)  Robotic assisted/laparoscopic jejunostomy tube placement.  [de-identified] : 03/03/25 [de-identified] : 4 [de-identified] : | | Surgical Pathology:  1. Esophageal nodule biopsy at 20 cm: - Squamous mucosa negative for carcinoma.  2. Lung, right lower lobe; wedge resection: - Lung tissue negative for carcinoma. - Emphysematous changes, areas of subpleural fibrosis, and metaplastic and reactive changes.  3. Mireya-esophageal lymph node: - Fibroadipose tissue negative for carcinoma.  4. Proximal esophageal margin; excision: - Esophageal tissue negative for carcinoma.  5. Esophagus and stomach; esophagogastrectomy: - Tumor bed negative for definitive evidence of residual carcinoma - see Note and Synoptic Report. - Ulcerated tumor bed measures 11.0 cm in greatest dimension (entirely submitted for histologic evaluation). - Treatment-related changes present with focal atypical cells, favor reactive. - Numerous mucin pools present in the tumor bed and in lymph nodes, but negative for residual tumor cells. - Thirty-seven lymph nodes negative for definitive evidence of residual carcinoma (0/37)

## 2025-04-03 NOTE — REASON FOR VISIT
[de-identified] : 3/3/25 s/p RA Arcadio Esophagectomy and 3/13/25 s/p thoracic  duct embolization [de-identified] : 3/3/2025 [de-identified] : 72 YO Male, nonsmoker, with PMHx of HTN, HLD, DMII, PE, Afib (on Eliquis), renal insufficiency (kidney donor in 1986). He was referred by radiation oncologist Dr. 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  Patient underwent chemotherapy and radiation starting 12/17/24 for 5 weeks. Patient presented to [facility] on 03/03/25 and underwent a RA Arcadio Esophagectomy, pyloromyotomy and J-Tube placement with Dr. Sherine Howell, and co-surgeons Dr. Rojas and Dr. Mirza.  Intraoperative course was uncomplicated. Patient arrived to CTICU extubated and on no drips. POD1, IV hydration for orthostatic hypotension. Daigle placed for retention.  POD2, L pigtail placed drained 1 L serosanguinous fluid. Tube feeds increased to 30cc/hr. POD 3-4 pt with new milky output from bilateral chest tubes. Sent to the lab for triglyceride levels which were elevated. Tube feeds held, and PPN without triglycerides initiated. POD5 he underwent a talc pleurodesis to right chest tube. POD6 his NGT was removed, and he underwent a second talc pleurodesis to the right CT. POD7 he had transient hypotension s/p coughing fit in chair. AF with transient tachycardia HR in 100s-130s. Asymptomatic. POD8, low UOP over the AM, given 500cc NS, started on NS 75cc/hr. Advanced diet to sips w/ meds and TF increased to 30cc/hr and patient remained on peripheral nutrition. Residual talc in R claudia drain, drain stripped. POD9, tubes w/ cumulative output of 1L serous output, per Dr. Howell TF still increased to 50cc/hr.  to discuss w/ IR of lymphangiography, CT A/P obtained pre-op for OR planning, requested by IR. Still w/ low albumin/ protein on labs and elevated glucose levels, addressed w/ nutrition. POD11, tube feeds held overnight, s/p lymphangiography and thoracic duct embolization w/ IR. POD12, PPN and TF remained held, remained on standing fluids. Overnight, all tubes with significantly less output, so L pigtail was removed without incidence and stable CXR. Remaining chest tubes placed to waterseal. Patient worked w/ PT again and became orthostatic upon standing w/ SBP to 70s, HR to 120s and some dizziness, given 1L LR. On POD 13 tachycardic while sitting in chair with increase in Chest tube output when moving, albumen given and resolved. TFs started at 20 and increased to 40 overnight. On POD 14 daigle removed, passed TOV. CT removed as well as claudia, CXR with tiny ptx. Neck staples removed and TFs are titrating to goal. Per PT, cleared for home with home PT. POD 14 PO lasix in AM for peripheral edema. Hypotensive while in the chair to SBP 80's. On a CLD w/ TFs at goal. POD16 cardiology recommended sending patient home with 25 mg Toprol and 20 mg Lasix. Patient's with soft pressures over past few days (SBP 80's this AM), Dr. Howell and cardiology still requesting patient go home with BB and lasix. J-tube site with surrounding cellulitis, patient started on 10 day course of Keflex. Cleared for discharge per Dr. Howell. At time of discharge he is hemodynamically stable, voiding well, passing gas, ambulating in the hallway, and pain controlled under oral regime.  Patient now presents for his post operative visit with Dr. Mirza.

## 2025-04-03 NOTE — COUNSELING
[FreeTextEntry1] : Surgical pathology 3/3/2025 [facility] Accession Number : [id number] Patient:     MILLIE CISSE Accession:                             [id number] Collected Date/Time:                   3/3/2025 08:20 EST Received Date/Time:                    3/3/2025 08:34 EST  Surgical Pathology Report - Auth (Verified)  Specimen(s) Submitted 1  Esophageal nodule biopsy at 20 cm 2  Right lower lobe wedge 3  Periesophageal LN 4  Proximal esophageal margin 5  Esophagogastrectomy with LNs  Final Diagnosis  1. Esophageal nodule biopsy at 20 cm: - Squamous mucosa negative for carcinoma.  2. Lung, right lower lobe; wedge resection: - Lung tissue negative for carcinoma. - Emphysematous changes, areas of subpleural fibrosis, and metaplastic and reactive changes.  3. Mireya-esophageal lymph node: - Fibroadipose tissue negative for carcinoma.  4. Proximal esophageal margin; excision: - Esophageal tissue negative for carcinoma.  5. Esophagus and stomach; esophagogastrectomy: - Tumor bed negative for definitive evidence of residual carcinoma - see Note and Synoptic Report. - Ulcerated tumor bed measures 11.0 cm in greatest dimension (entirely submitted for histologic evaluation). - Treatment-related changes present with focal atypical cells, favor reactive. - Numerous mucin pools present in the tumor bed and in lymph nodes, but negative for residual tumor cells. - Thirty-seven lymph nodes negative for definitive evidence of residual carcinoma (0/37).  Note: Key slides were reviewed intradepartmentally.  Verified by: Lai Grewal M.D. (Electronic Signature) Reported on: 03/21/25 14:26 [facility], [facility], [address] [place] [address] Phone: [phone]   Fax: [phone] _________________________________________________________________  Synoptic Summary 5: Esophagus Specimen Procedure:  Esophagogastrectomy Tumor Tumor Site:  Distal esophagus (low thoracic esophagus); Esophagogastric junction (EGJ) Relationship of Tumor to Esophagogastric Junction:    Tumor midpoint lies in the distal esophagus AND tumor involves the esophagogastric junction Distance of Tumor Center from Esophagogastric Junction:     3.0 cm Histologic Type:   No residual tumor cells identified Tumor Size:  Cannot be determined - No residual tumor cells identified Tumor Extent:   No evidence of primary tumor Treatment Effect:   Present, with no viable cancer cells (complete response, score 0) Lymphovascular Invasion:   Not identified Margins Margin Status for Invasive Carcinoma:    Not applicable Margin Status for Dysplasia and Intestinal Metaplasia:     Not applicable Regional Lymph Nodes Regional Lymph Node Status:   All regional lymph nodes negative for tumor Number of Lymph Nodes Examined:   37 Pathologic Stage Classification (pTNM, AJCC 8th Edition) TNM Descriptors:   y (post-treatment) pT Category:   pT0  pN Category:   pN0  CAP [facility] 2022 Q2 Release  Intraoperative Consultation 1FSA. Esophageal nodule biopsy at 20 cm  ,  frozen section diagnosis: - Slight fragments of benign squamous epithelium By Dr. AMBROSIO/GASTON  03/03/2025 8:55 AM  Frozen section performed at [facility], Department of Pathology, [facility] [address] [place] [address]. The frozen section and frozen section control have been reviewed by the final pathologist who verified this report.  Clinical Information Esophageal cancer   Gross Description 1.  Received: Fresh for intraoperative consultation labeled   "esophageal nodule at 20 cm" Size:  Multiple fragments ranging from 0.1 cm to 0.3 cm in greatest dimension Description:  Red-tan soft tissue fragments Submitted:  Entirely for frozen section diagnosis in one cassette: 1FSA  2.  Received: Fresh labeled  "right lower lobe wedge"  and consists of a wedge resection Integrity:  Disrupted Size:  8 x 3 x 1.3 cm       Weight: 11 g Resection Margin:  11 cm long , stapled Inking Resection Margin:  The staple line is shaved and the underlying parenchyma is inked orange Pleura:  Green Pleura:  The pleura is mostly fragmented/cauterized, and contains areas of slippage.  A photograph is taken Parenchyma:  Pink and spongy Lymph Node(s):  Not identified Additional Comments:   The lung parenchyma is grossly unremarkable. There are multiple micronodules described on CT scan on November 2024. With exception of the staple line, the entire wedge is serially and sequentially submitted Submitted:  Entirely in 10 cassettes: 2A-2J: Entire wedge, serially and sequentially submitted  3.  Received: Fresh labeled  "periesophageal lymph node" Lymph Nodes:  1 Size:  1.2 x 0.7 x 0.5 cm Submitted:  Entirely in one cassette: 3A  4.  Received: In formalin labeled  "proximal esophageal margin" Size:  One fragment measuring 2.5 x 1.1 x 0.6 cm Description:  The specimen is a fragment of mucosa containing an L shaped staple line measuring 1.5 and 1.7 cm.  One edge of the margin is indicated with a suture, which is inked green. Submitted:  Entirely in one cassette: 4A  5.  Received: Fresh labeled  "esophagogastrectomy with lymph nodes" Dimensions: Esophagus Length:  14 cm Circumference at Proximal Margin:   5 cm Esophagus Wall Thickness:   0.7 cm Paraesophageal Tissue:   Present to a maximum width of 2.5 cm Circumference at Distal Margin:   14 cm Gastric Wall Thickness:   0.4 cm Lesser Omentum:  4.3 x 3 x 0.6 cm Greater Omentum:  10 x 2.5 x 1 cm Area of Interest - Ulcer: Size:  11 x 4.5 x 2 cm, pink, with a friable and exudative surface, and  an underlying rubbery wall.  The ulcerated GE junction is depressed with raised borders and extends superiorly 8 cm.  There is also partial inferior extension into the gastric mucosa Location:  Lower esophagus and GE junction Percent of Circumference:   90 to 100% Cut Surface:  Tan-white and firm Distance from Proximal Margin:   6 cm Distance from Distal Margin:   3 cm Distance from Lesser Omental Margin:   2.5 cm Distance from Greater Omental Margin:   6 cm Distance Distal to EG Junction:   Midpoint is 3 cm, superior to Z-line Overlying Adventitia/Serosal Surface:   Pink-red, contracted Remaining Serosa:  Tan-pink and unremarkable Remaining Mucosa:  Tan-pink and unremarkable Possible Lymph Node(s): Lesser Omentum: Greater Omentum: Paraesophageal Soft Tissue: Inking: Circumferential Margin:   Green Proximal:  Blue Distal:  Black Lesser Omental Margin:   Not inked Greater Omental Margin:   Not inked Submitted:  representative sections to include entire ulcer in 82 cassettes: 5A: Proximal and distal shaved margins 5B-5X: Ulcer with circumferential margin 5Y: 3 possible paraesophageal nodes 5Z: 3 possible paraesophageal nodes 5AA-5AB: Remaining paraesophageal fat 5AC: 5 possible perigastric nodes 5AD: 5 possible perigastric nodes 5AE: 1 lymph node, bisected 5AF-5AH: Representative adipose tissue 5AI-5BX: Remaining ulcer, entirely submitted 5BY: 1 paraesophageal lymph node 5BZ: 1 paraesophageal lymph node 5CA: 4 possible paraseophageal lymph nodes 5CB: 1 possible paraseophageal lymph node 5CC: 2 possible paraseophageal lymph nodes 5CD: 1 possible paraseophageal lymph node, bisected

## 2025-04-03 NOTE — REASON FOR VISIT
[de-identified] : 3/3/25 s/p RA Arcadio Esophagectomy and 3/13/25 s/p thoracic  duct embolization [de-identified] : 3/3/2025 [de-identified] : 72 YO Male, nonsmoker, with PMHx of HTN, HLD, DMII, PE, Afib (on Eliquis), renal insufficiency (kidney donor in 1986). He was referred by radiation oncologist Dr. 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  Patient underwent chemotherapy and radiation starting 12/17/24 for 5 weeks. Patient presented to [facility] on 03/03/25 and underwent a RA Arcadio Esophagectomy, pyloromyotomy and J-Tube placement with Dr. Sherine Howell, and co-surgeons Dr. Rojas and Dr. Mirza.  Intraoperative course was uncomplicated. Patient arrived to CTICU extubated and on no drips. POD1, IV hydration for orthostatic hypotension. Daigle placed for retention.  POD2, L pigtail placed drained 1 L serosanguinous fluid. Tube feeds increased to 30cc/hr. POD 3-4 pt with new milky output from bilateral chest tubes. Sent to the lab for triglyceride levels which were elevated. Tube feeds held, and PPN without triglycerides initiated. POD5 he underwent a talc pleurodesis to right chest tube. POD6 his NGT was removed, and he underwent a second talc pleurodesis to the right CT. POD7 he had transient hypotension s/p coughing fit in chair. AF with transient tachycardia HR in 100s-130s. Asymptomatic. POD8, low UOP over the AM, given 500cc NS, started on NS 75cc/hr. Advanced diet to sips w/ meds and TF increased to 30cc/hr and patient remained on peripheral nutrition. Residual talc in R claudia drain, drain stripped. POD9, tubes w/ cumulative output of 1L serous output, per Dr. Howell TF still increased to 50cc/hr.  to discuss w/ IR of lymphangiography, CT A/P obtained pre-op for OR planning, requested by IR. Still w/ low albumin/ protein on labs and elevated glucose levels, addressed w/ nutrition. POD11, tube feeds held overnight, s/p lymphangiography and thoracic duct embolization w/ IR. POD12, PPN and TF remained held, remained on standing fluids. Overnight, all tubes with significantly less output, so L pigtail was removed without incidence and stable CXR. Remaining chest tubes placed to waterseal. Patient worked w/ PT again and became orthostatic upon standing w/ SBP to 70s, HR to 120s and some dizziness, given 1L LR. On POD 13 tachycardic while sitting in chair with increase in Chest tube output when moving, albumen given and resolved. TFs started at 20 and increased to 40 overnight. On POD 14 daigle removed, passed TOV. CT removed as well as claudia, CXR with tiny ptx. Neck staples removed and TFs are titrating to goal. Per PT, cleared for home with home PT. POD 14 PO lasix in AM for peripheral edema. Hypotensive while in the chair to SBP 80's. On a CLD w/ TFs at goal. POD16 cardiology recommended sending patient home with 25 mg Toprol and 20 mg Lasix. Patient's with soft pressures over past few days (SBP 80's this AM), Dr. Howell and cardiology still requesting patient go home with BB and lasix. J-tube site with surrounding cellulitis, patient started on 10 day course of Keflex. Cleared for discharge per Dr. Howell. At time of discharge he is hemodynamically stable, voiding well, passing gas, ambulating in the hallway, and pain controlled under oral regime.  Patient now presents for his post operative visit with Dr. Mirza.

## 2025-04-03 NOTE — ASSESSMENT
[FreeTextEntry1] : 75 y/o M now presents for second POA.   Patient intially presented on 3/24 for first POA.  HE was seen and evaluated. VSS at that time.  He was discharged from [facility] on clears and was tolerating them well.  At the last appt was instructed to advance to full liquids. He now presents stating he continues to improve and tolerate slow advancement.  HE has now been instructed to advance to soft diet.  He can increase his activity level as tolerated as well.  All questions have been answered.  Patient and family & neighbors express understanding and agree.    He will return next week for removal of J-tube.

## 2025-04-03 NOTE — ASSESSMENT
[FreeTextEntry1] : 72 y/o male, nonsmoker, with PMHx of HTN, HLD, DMII, PE, Afib (on Eliquis), renal insufficiency (kidney donor in 1986). He was referred by radiation oncologist Dr. 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    Patient underwent chemotherapy and radiation starting 12/17/24 for 5 weeks.  Patient presented to [facility] on 03/03/25 and underwent a RA Arcadio Esophagectomy, pyloromyotomy and J-Tube placement.  Post-op course c/b urinary retention, orthostatic hypotension, and chyle leak. Talc pleurodesis x 2 was performed to right chest tube. On POD # 11 he underwent lymphangiography and thoracic duct embolization w/ IR. Membreno and chest tubes removed on POD # 14. He was tolerating a clear liquid diet and tube feeds were at goal. On POD # 16 Cardiology started him on Toprol 25mg and Lasix 20mg with plans to continue at home on 3/19. He was also started on a 10-day course of Keflex for cellulitis surrounding his J-tube.   On 3/22 the call center contacted the inpt team re: soft BPs (90s/50-60s). The patient was asymptomatic. He was instructed to half his Toprol dose to 12.5mg/day and to continue Lasix every other day. He was advised to take a log of his BPs.   He followed up with Dr. Mirza on 3/24 for an initial post-op visit. He presents today via telehealth for a post-op visit and to assess his recovery.   Surgical Pathology: [id number]- no residual tumor cells identified; 37 lymph nodes examined and negative.   He feels well and has no complaints. He is tolerating clear and was advanced to a soft solid diet today. His incisions are CDI and his J-tube site erythema resolved with antibiotics. He will follow up with Dr. Mirza next week and with his oncologist.  Plan: Follow up with Dr. Mirza next week  I, Dr. Sherine Howell MD, personally performed the evaluation and management (E/M) services for this established patient who presents today with (a) new problem(s)/exacerbation of (an) existing condition(s).  That E/M includes conducting the clinically appropriate interval history &/or exam, assessing all new/exacerbated conditions, and establishing a new plan of care. I have also independently reviewed the medical records and imaging at the time of this office visit, and discussed the above mentioned images with interpretations with the patient. Today, my ERIC was here to observe &/or participate in the visit & follow plan of care established by me.  Total time of 10 minutes with > 50% spent with the patient discussing pathology and postoperative course.

## 2025-04-03 NOTE — REASON FOR VISIT
[Home] : at home, [unfilled] , at the time of the visit. [Medical Office: ([facility])___] : at the medical office located in  [Telehealth (audio & video)] : This visit was provided via telehealth using real-time 2-way audio visual technology. [Verbal consent obtained from patient] : the patient, [unfilled] [de-identified] : EGD with biopsies of esophageal nodules at 20 cm from the incisiors.  Robotic assisted/video-assisted thoracoscopic surgery/laparoscopic modified Arcadio 3-hole esophagectomy with pyloromyotomy.  Right robotic assisted/video-assisted thoracoscopic surgery with extensive lysis of adhesions, right lower lobe wedge, thoracic duct ligation.  Fluorescein Angiography of the Gastric Conduit with indocyanine green (ICG)  Robotic assisted/laparoscopic jejunostomy tube placement.  [de-identified] : 03/03/25 [de-identified] : 4 [de-identified] : | | Surgical Pathology:  1. Esophageal nodule biopsy at 20 cm: - Squamous mucosa negative for carcinoma.  2. Lung, right lower lobe; wedge resection: - Lung tissue negative for carcinoma. - Emphysematous changes, areas of subpleural fibrosis, and metaplastic and reactive changes.  3. Mireya-esophageal lymph node: - Fibroadipose tissue negative for carcinoma.  4. Proximal esophageal margin; excision: - Esophageal tissue negative for carcinoma.  5. Esophagus and stomach; esophagogastrectomy: - Tumor bed negative for definitive evidence of residual carcinoma - see Note and Synoptic Report. - Ulcerated tumor bed measures 11.0 cm in greatest dimension (entirely submitted for histologic evaluation). - Treatment-related changes present with focal atypical cells, favor reactive. - Numerous mucin pools present in the tumor bed and in lymph nodes, but negative for residual tumor cells. - Thirty-seven lymph nodes negative for definitive evidence of residual carcinoma (0/37)

## 2025-04-03 NOTE — ASSESSMENT
[FreeTextEntry1] : 74 y/o male, nonsmoker, with PMHx of HTN, HLD, DMII, PE, Afib (on Eliquis), renal insufficiency (kidney donor in 1986). He was referred by radiation oncologist Dr. 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    Patient underwent chemotherapy and radiation starting 12/17/24 for 5 weeks.  Patient presented to [facility] on 03/03/25 and underwent a RA Arcadio Esophagectomy, pyloromyotomy and J-Tube placement.  Post-op course c/b urinary retention, orthostatic hypotension, and chyle leak. Talc pleurodesis x 2 was performed to right chest tube. On POD # 11 he underwent lymphangiography and thoracic duct embolization w/ IR. Membreno and chest tubes removed on POD # 14. He was tolerating a clear liquid diet and tube feeds were at goal. On POD # 16 Cardiology started him on Toprol 25mg and Lasix 20mg with plans to continue at home on 3/19. He was also started on a 10-day course of Keflex for cellulitis surrounding his J-tube.   On 3/22 the call center contacted the inpt team re: soft BPs (90s/50-60s). The patient was asymptomatic. He was instructed to half his Toprol dose to 12.5mg/day and to continue Lasix every other day. He was advised to take a log of his BPs.   He followed up with Dr. Mirza on 3/24 for an initial post-op visit. He presents today via telehealth for a post-op visit and to assess his recovery.   Surgical Pathology: [id number]- no residual tumor cells identified; 37 lymph nodes examined and negative.   He feels well and has no complaints. He is tolerating clear and was advanced to a soft solid diet today. His incisions are CDI and his J-tube site erythema resolved with antibiotics. He will follow up with Dr. Mirza next week and with his oncologist.  Plan: Follow up with Dr. Mirza next week  I, Dr. Sherine Howell MD, personally performed the evaluation and management (E/M) services for this established patient who presents today with (a) new problem(s)/exacerbation of (an) existing condition(s).  That E/M includes conducting the clinically appropriate interval history &/or exam, assessing all new/exacerbated conditions, and establishing a new plan of care. I have also independently reviewed the medical records and imaging at the time of this office visit, and discussed the above mentioned images with interpretations with the patient. Today, my ERIC was here to observe &/or participate in the visit & follow plan of care established by me.  Total time of 10 minutes with > 50% spent with the patient discussing pathology and postoperative course.

## 2025-04-03 NOTE — REASON FOR VISIT
[Home] : at home, [unfilled] , at the time of the visit. [Medical Office: ([facility])___] : at the medical office located in  [Telehealth (audio & video)] : This visit was provided via telehealth using real-time 2-way audio visual technology. [Verbal consent obtained from patient] : the patient, [unfilled] [de-identified] : EGD with biopsies of esophageal nodules at 20 cm from the incisiors.  Robotic assisted/video-assisted thoracoscopic surgery/laparoscopic modified Arcadio 3-hole esophagectomy with pyloromyotomy.  Right robotic assisted/video-assisted thoracoscopic surgery with extensive lysis of adhesions, right lower lobe wedge, thoracic duct ligation.  Fluorescein Angiography of the Gastric Conduit with indocyanine green (ICG)  Robotic assisted/laparoscopic jejunostomy tube placement.  [de-identified] : 03/03/25 [de-identified] : 4 [de-identified] : | | Surgical Pathology:  1. Esophageal nodule biopsy at 20 cm: - Squamous mucosa negative for carcinoma.  2. Lung, right lower lobe; wedge resection: - Lung tissue negative for carcinoma. - Emphysematous changes, areas of subpleural fibrosis, and metaplastic and reactive changes.  3. Mireya-esophageal lymph node: - Fibroadipose tissue negative for carcinoma.  4. Proximal esophageal margin; excision: - Esophageal tissue negative for carcinoma.  5. Esophagus and stomach; esophagogastrectomy: - Tumor bed negative for definitive evidence of residual carcinoma - see Note and Synoptic Report. - Ulcerated tumor bed measures 11.0 cm in greatest dimension (entirely submitted for histologic evaluation). - Treatment-related changes present with focal atypical cells, favor reactive. - Numerous mucin pools present in the tumor bed and in lymph nodes, but negative for residual tumor cells. - Thirty-seven lymph nodes negative for definitive evidence of residual carcinoma (0/37)

## 2025-04-03 NOTE — DISCUSSION/SUMMARY
[Doing Well] : is doing well [Excellent Pain Control] : has excellent pain control [No Sign of Infection] : is showing no signs of infection [0] : 0 [Remove Sutures/Staples] : removed sutures/staples [de-identified] : remove J-tube

## 2025-04-10 NOTE — ASSESSMENT
[FreeTextEntry1] : 73 y/o M now presents for third POA.  Patient  presented on 3/24 for first POA. He was seen and evaluated. VSS at that time. He was discharged from [facility] on clears and was tolerating them well. At that  appt was instructed to advance to full liquids.   He presented for his 2nd POA on 4/3.  he continued to improve and tolerate slow advancement. He has been tolerating soft diet since that time. He was also instructed to increase his activity level as tolerated as well.  He now presents for his 3rd postop appointment.  His J-tube was removed by dr. ramirez.   All questions have been answered. Patient and family & neighbors express understanding and agree.

## 2025-04-10 NOTE — ASSESSMENT
[FreeTextEntry1] : 73 y/o M now presents for third POA.  Patient  presented on 3/24 for first POA. He was seen and evaluated. VSS at that time. He was discharged from [facility] on clears and was tolerating them well. At that  appt was instructed to advance to full liquids.   He presented for his 2nd POA on 4/3.  he continued to improve and tolerate slow advancement. He has been tolerating soft diet since that time. He was also instructed to increase his activity level as tolerated as well.  He now presents for his 3rd postop appointment.  His J-tube was removed by dr. ramirez.   All questions have been answered. Patient and family & neighbors express understanding and agree.

## 2025-04-10 NOTE — REASON FOR VISIT
[de-identified] : RA Arcadio Esophagectomy and 3/13/25 s/p thoracic  duct embolization [de-identified] : 3/3/2025 [de-identified] :  74 YO Male, nonsmoker, with PMHx of HTN, HLD, DMII, PE, Afib (on Eliquis), renal insufficiency (kidney donor in 1986). He was referred by radiation oncologist Dr. 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 Patient underwent chemotherapy and radiation starting 12/17/24 for 5 weeks. Patient presented to [facility] on 03/03/25 and underwent a RA Arcadio Esophagectomy, pyloromyotomy and J-Tube placement with Dr. Sherine Howell, and co-surgeons Dr. Rojas and Dr. Mirza. Intraoperative course was uncomplicated. Patient arrived to CTICU extubated and on no drips. POD1, IV hydration for orthostatic hypotension. Daigle placed for retention. POD2, L pigtail placed drained 1 L serosanguinous fluid. Tube feeds increased to 30cc/hr. POD 3-4 pt with new milky output from bilateral chest tubes. Sent to the lab for triglyceride levels which were elevated. Tube feeds held, and PPN without triglycerides initiated. POD5 he underwent a talc pleurodesis to right chest tube. POD6 his NGT was removed, and he underwent a second talc pleurodesis to the right CT. POD7 he had transient hypotension s/p coughing fit in chair. AF with transient tachycardia HR in 100s-130s. Asymptomatic. POD8, low UOP over the AM, given 500cc NS, started on NS 75cc/hr. Advanced diet to sips w/ meds and TF increased to 30cc/hr and patient remained on peripheral nutrition. Residual talc in R claudia drain, drain stripped. POD9, tubes w/ cumulative output of 1L serous output, per Dr. Howell TF still increased to 50cc/hr.  to discuss w/ IR of lymphangiography, CT A/P obtained pre-op for OR planning, requested by IR. Still w/ low albumin/ protein on labs and elevated glucose levels, addressed w/ nutrition. POD11, tube feeds held overnight, s/p lymphangiography and thoracic duct embolization w/ IR. POD12, PPN and TF remained held, remained on standing fluids. Overnight, all tubes with significantly less output, so L pigtail was removed without incidence and stable CXR. Remaining chest tubes placed to waterseal. Patient worked w/ PT again and became orthostatic upon standing w/ SBP to 70s, HR to 120s and some dizziness, given 1L LR. On POD 13 tachycardic while sitting in chair with increase in Chest tube output when moving, albumen given and resolved. TFs started at 20 and increased to 40 overnight. On POD 14 daigle removed, passed TOV. CT removed as well as claudia, CXR with tiny ptx. Neck staples removed and TFs are titrating to goal. Per PT, cleared for home with home PT. POD 14 PO lasix in AM for peripheral edema. Hypotensive while in the chair to SBP 80's. On a CLD w/ TFs at goal. POD16 cardiology recommended sending patient home with 25 mg Toprol and 20 mg Lasix. Patient's with soft pressures over past few days (SBP 80's this AM), Dr. Howell and cardiology still requesting patient go home with BB and lasix. J-tube site with surrounding cellulitis, patient started on 10 day course of Keflex. Cleared for discharge per Dr. Howell. At time of discharge he is hemodynamically stable, voiding well, passing gas, ambulating in the hallway, and pain controlled under oral regime.

## 2025-04-10 NOTE — REASON FOR VISIT
[de-identified] : RA Arcadio Esophagectomy and 3/13/25 s/p thoracic  duct embolization [de-identified] : 3/3/2025 [de-identified] :  74 YO Male, nonsmoker, with PMHx of HTN, HLD, DMII, PE, Afib (on Eliquis), renal insufficiency (kidney donor in 1986). He was referred by radiation oncologist Dr. 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 Patient underwent chemotherapy and radiation starting 12/17/24 for 5 weeks. Patient presented to [facility] on 03/03/25 and underwent a RA Arcadio Esophagectomy, pyloromyotomy and J-Tube placement with Dr. Sherine Howell, and co-surgeons Dr. Rojas and Dr. Mirza. Intraoperative course was uncomplicated. Patient arrived to CTICU extubated and on no drips. POD1, IV hydration for orthostatic hypotension. Daigle placed for retention. POD2, L pigtail placed drained 1 L serosanguinous fluid. Tube feeds increased to 30cc/hr. POD 3-4 pt with new milky output from bilateral chest tubes. Sent to the lab for triglyceride levels which were elevated. Tube feeds held, and PPN without triglycerides initiated. POD5 he underwent a talc pleurodesis to right chest tube. POD6 his NGT was removed, and he underwent a second talc pleurodesis to the right CT. POD7 he had transient hypotension s/p coughing fit in chair. AF with transient tachycardia HR in 100s-130s. Asymptomatic. POD8, low UOP over the AM, given 500cc NS, started on NS 75cc/hr. Advanced diet to sips w/ meds and TF increased to 30cc/hr and patient remained on peripheral nutrition. Residual talc in R claudia drain, drain stripped. POD9, tubes w/ cumulative output of 1L serous output, per Dr. Howell TF still increased to 50cc/hr.  to discuss w/ IR of lymphangiography, CT A/P obtained pre-op for OR planning, requested by IR. Still w/ low albumin/ protein on labs and elevated glucose levels, addressed w/ nutrition. POD11, tube feeds held overnight, s/p lymphangiography and thoracic duct embolization w/ IR. POD12, PPN and TF remained held, remained on standing fluids. Overnight, all tubes with significantly less output, so L pigtail was removed without incidence and stable CXR. Remaining chest tubes placed to waterseal. Patient worked w/ PT again and became orthostatic upon standing w/ SBP to 70s, HR to 120s and some dizziness, given 1L LR. On POD 13 tachycardic while sitting in chair with increase in Chest tube output when moving, albumen given and resolved. TFs started at 20 and increased to 40 overnight. On POD 14 daigle removed, passed TOV. CT removed as well as claudia, CXR with tiny ptx. Neck staples removed and TFs are titrating to goal. Per PT, cleared for home with home PT. POD 14 PO lasix in AM for peripheral edema. Hypotensive while in the chair to SBP 80's. On a CLD w/ TFs at goal. POD16 cardiology recommended sending patient home with 25 mg Toprol and 20 mg Lasix. Patient's with soft pressures over past few days (SBP 80's this AM), Dr. Howell and cardiology still requesting patient go home with BB and lasix. J-tube site with surrounding cellulitis, patient started on 10 day course of Keflex. Cleared for discharge per Dr. Howell. At time of discharge he is hemodynamically stable, voiding well, passing gas, ambulating in the hallway, and pain controlled under oral regime.

## 2025-04-23 NOTE — PHYSICAL EXAM
[Hearing Threshold Finger Rub Not [place]] : hearing was normal [] : no respiratory distress [Exaggerated Use Of Accessory Muscles For Inspiration] : no accessory muscle use [Heart Rate And Rhythm] : heart rate and rhythm were normal [Arterial Pulses Normal] : the arterial pulses were normal [Abdomen Soft] : soft [Nondistended] : nondistended [Cervical Lymph Nodes Enlarged Posterior Bilaterally] : posterior cervical [Cervical Lymph Nodes Enlarged Anterior Bilaterally] : anterior cervical [Supraclavicular Lymph Nodes Enlarged Bilaterally] : supraclavicular [Normal] : no focal deficits

## 2025-04-23 NOTE — PHYSICAL EXAM
[Hearing Threshold Finger Rub Not [place]] : hearing was normal [] : no respiratory distress [Exaggerated Use Of Accessory Muscles For Inspiration] : no accessory muscle use [Heart Rate And Rhythm] : heart rate and rhythm were normal [Arterial Pulses Normal] : the arterial pulses were normal [Abdomen Soft] : soft [Nondistended] : nondistended [Cervical Lymph Nodes Enlarged Posterior Bilaterally] : posterior cervical [Cervical Lymph Nodes Enlarged Anterior Bilaterally] : anterior cervical [Supraclavicular Lymph Nodes Enlarged Bilaterally] : supraclavicular [Normal] : no focal deficits

## 2025-04-23 NOTE — PHYSICAL EXAM
[Hearing Threshold Finger Rub Not [place]] : hearing was normal [] : no respiratory distress [Exaggerated Use Of Accessory Muscles For Inspiration] : no accessory muscle use [Heart Rate And Rhythm] : heart rate and rhythm were normal [Arterial Pulses Normal] : the arterial pulses were normal [Abdomen Soft] : soft [Nondistended] : nondistended [Cervical Lymph Nodes Enlarged Posterior Bilaterally] : posterior cervical [Cervical Lymph Nodes Enlarged Anterior Bilaterally] : anterior cervical [Supraclavicular Lymph Nodes Enlarged Bilaterally] : supraclavicular [Normal] : no focal deficits

## 2025-04-23 NOTE — PHYSICAL EXAM
[Hearing Threshold Finger Rub Not [place]] : hearing was normal [] : no respiratory distress [Exaggerated Use Of Accessory Muscles For Inspiration] : no accessory muscle use [Arterial Pulses Normal] : the arterial pulses were normal [Heart Rate And Rhythm] : heart rate and rhythm were normal [Abdomen Soft] : soft [Nondistended] : nondistended [Cervical Lymph Nodes Enlarged Posterior Bilaterally] : posterior cervical [Cervical Lymph Nodes Enlarged Anterior Bilaterally] : anterior cervical [Supraclavicular Lymph Nodes Enlarged Bilaterally] : supraclavicular [Normal] : no focal deficits

## 2025-04-24 NOTE — DISEASE MANAGEMENT
[Clinical] : TNM Stage: c [III] : III [TTNM] : 3 [NTNM] : 1 [MTNM] : 0 [de-identified] : completed 23/23 fractions to a dose of 4140 cGy to the esophagus/LN on 1/24/2025

## 2025-04-24 NOTE — DISEASE MANAGEMENT
[Clinical] : TNM Stage: c [III] : III [TTNM] : 3 [NTNM] : 1 [MTNM] : 0 [de-identified] : completed 23/23 fractions to a dose of 4140 cGy to the esophagus/LN on 1/24/2025

## 2025-04-24 NOTE — DISEASE MANAGEMENT
[Clinical] : TNM Stage: c [III] : III [TTNM] : 3 [NTNM] : 1 [MTNM] : 0 [de-identified] : completed 23/23 fractions to a dose of 4140 cGy to the esophagus/LN on 1/24/2025

## 2025-04-24 NOTE — HISTORY OF PRESENT ILLNESS
[FreeTextEntry1] : Mr. 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 He completed 23/23 fractions to a dose of 4140 cGy to the esophagus/LN on 1/24/2025   He initially presented with unexpected weight loss associated with a poor appetite.  10/24/24 CT Abdomen/Pelvis ([facility]) Impression: 1. Moderate hiatal hernia. Prominent incompletely imaged thickening of the distal esophagus and hiatal hernia. Recommend correlation with upper endoscopy if not previously assessed. 2. S/p left nephrectomy. Cholelithiasis. No lymphadenopathy or ascites.  11/19/24 EGD with Dr. Bains showed a circumferential, friable, stenotic mass lesion extending from mid esophagus at 28cm to the EG Junction at 40cm. Pathology revealed an invasive poorly differentiated adenocarcinoma, with signet cell and mucinous features. The depth of invasion cannot be evaluated d/t the fragmentation and superficial nature of the specimens. MMR Intact. He also had a colonoscopy done at the same time showing tubular adenoma. Colonic mucosa, negative for dysplasia. There is no evidence of high-grade dysplasia or malignancy.  CA-19-9: 301 U/ml CEA: 16.6 ng/ml  11/26/24 CT Abdomen/Pelvis ([facility]) showed a moderate amount of stool in the colon. Hiatal hernia again noted. Prominent thickening of the visualized esophagus again noted. Exophytic component of the mass versus enlarged paraesophageal lymph node measuring ~ 1.9 x 1.3cm. - No evidence of acute inflammatory process in the abdomen or pelvis. - s/p left nephrectomy.  11/28/24 CT C/A/P ([facility]) Impression: - Mass-like thickening of the distal esophagus compatible with primary malignancy. - There is a 2cm right paraesophageal lymph node. - Multiple tiny pulmonary nodules. Although they dont have suspicious features, they are technically indeterminate because of their multiplicity and the patient's history. They are too small to evaluate via PET-CT. Can consider close interval follow-up.  12/4/24 PET/CT ([facility]) 1. Intense increased FDG avidity is associated with a segment of diffuse thickening of the mid and distal esophagus, SUV max 4.6 2. A 2cm paraesophageal node, has mild FDG avidity, SUV max 2.5. Another probable paraesophageal nodule at the level of the diaphragm measure 1.8 x 1.4cm, SUV max 2.7. 3. No FDG-avid pulmonary nodule. Previously seen small pulmonary nodules are below the resolution of PET-CT scan. A trace right pleural effusion is new and associated with minimal FDG avidity, SUV max 1.7.  12/10/24 Mr. Garner presents today for consultation for radiation as referred by Dr. Gerardo. They discussed carboplatin/paclitaxel concurrently with radiation. Patient reports of fatigue, hiccups, occasional loss of voice & hoarseness, and left chest discomfort/pressure. He denies any chest pain, SOB, difficulty swallowing. He started drinking boost as recommended by Dr. Gerardo, he is able to tolerate food without difficulty.  4/23/2025 Mr. Garner presents today for a follow up. He completed 23/23 fractions to a dose of 4140 cGy to the esophagus/LN on 1/24/2025.  2/19/2025 PET/CT ([facility]) revealed 1.  Intense activity in the distal esophagus.  The overall extent of activity is similar to that seen on the prior study, but maximum SUV has actually increased, probably related to radiation therapy. 2.  Previously identified lymph nodes show no significant FDG uptake, but review of the prior PET scan suggests that there may not have been significant activity prior to treatment. 3.  No abnormal FDG uptake is seen in the chest despite the presence of multiple micronodules. 4.  Patient is status post left nephrectomy (kidney donor).  Pathology reportedly shows an invasive poorly differentiated adenocarcinoma with signet ring cell and mucinous features.  It should be noted that both signet ring cell and mucinous tumors may have limited FDG affinity.  In some cases, FDG uptake may be related to an inflammatory reaction rather than tumor uptake suggesting that the identified lymph nodes on the CT scan may be falsely negative.  This phenomenon may explain the unusual increase in FDG uptake after treatment.  For that reason, if lymph nodes are positive at the time of surgery, caution should be exercised when relying on FDG PET scanning for follow-up studies.  The patient was scheduled for EGD, Robotic Assisted Three-Hole Esophagectomy, J-tube, Possible Laparotomy Possible Thoracotomy at [facility] on 3/3/2025 with Dr. Rojas and Dr. Mirza as co-surgeon on 3/3/2025.  Surgical Pathology revealed:  1. Esophageal nodule biopsy at 20 cm: - Squamous mucosa negative for carcinoma.  2. Lung, right lower lobe; wedge resection: - Lung tissue negative for carcinoma. - Emphysematous changes, areas of subpleural fibrosis, and metaplastic and reactive changes.  3. Mireya-esophageal lymph node: - Fibroadipose tissue negative for carcinoma.  4. Proximal esophageal margin; excision: - Esophageal tissue negative for carcinoma.  5. Esophagus and stomach; esophagogastrectomy: - Tumor bed negative for definitive evidence of residual carcinoma - see Note and Synoptic Report. - Ulcerated tumor bed measures 11.0 cm in greatest dimension (entirely submitted for histologic evaluation). - Treatment-related changes present with focal atypical cells, favor reactive. - Numerous mucin pools present in the tumor bed and in lymph nodes, but negative for residual tumor cells. - Thirty-seven lymph nodes negative for definitive evidence of residual carcinoma (0/37)  5: Esophagus Specimen Procedure:  Esophagogastrectomy Tumor Tumor Site:  Distal esophagus (low thoracic esophagus); Esophagogastric junction (EGJ) Relationship of Tumor to Esophagogastric Junction:    Tumor midpoint lies in the distal esophagus AND tumor involves the esophagogastric junction Distance of Tumor Center from Esophagogastric Junction:     3.0 cm Histologic Type:   No residual tumor cells identified Tumor Size:  Cannot be determined - No residual tumor cells identified Tumor Extent:   No evidence of primary tumor Treatment Effect:   Present, with no viable cancer cells (complete response, score 0) Lymphovascular Invasion:   Not identified Margins Margin Status for Invasive Carcinoma:    Not applicable Margin Status for Dysplasia and Intestinal Metaplasia:     Not applicable Regional Lymph Nodes Regional Lymph Node Status:   All regional lymph nodes negative for tumor Number of Lymph Nodes Examined:   37 Pathologic Stage Classification (pTNM, AJCC 8th Edition) TNM Descriptors:   y (post-treatment) pT Category:   pT0 pN Category:   pN0  Mr. Garner states that he has been doing extremely well since the surgery. He has had follow ups with Dr. Mirza and is continuing to get stronger every day. They removed the feeding tube. He is eating everything and with no difficulty swallowing. He was discharged from PT but will be looking to start some outpatient PT to increase his strength. He states that the arthritis is what is bothering him the most. He has an appointment with his PCP this week.

## 2025-04-24 NOTE — HISTORY OF PRESENT ILLNESS
[FreeTextEntry1] : Mr. 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 He completed 23/23 fractions to a dose of 4140 cGy to the esophagus/LN on 1/24/2025   He initially presented with unexpected weight loss associated with a poor appetite.  10/24/24 CT Abdomen/Pelvis ([facility]) Impression: 1. Moderate hiatal hernia. Prominent incompletely imaged thickening of the distal esophagus and hiatal hernia. Recommend correlation with upper endoscopy if not previously assessed. 2. S/p left nephrectomy. Cholelithiasis. No lymphadenopathy or ascites.  11/19/24 EGD with Dr. Bains showed a circumferential, friable, stenotic mass lesion extending from mid esophagus at 28cm to the EG Junction at 40cm. Pathology revealed an invasive poorly differentiated adenocarcinoma, with signet cell and mucinous features. The depth of invasion cannot be evaluated d/t the fragmentation and superficial nature of the specimens. MMR Intact. He also had a colonoscopy done at the same time showing tubular adenoma. Colonic mucosa, negative for dysplasia. There is no evidence of high-grade dysplasia or malignancy.  CA-19-9: 301 U/ml CEA: 16.6 ng/ml  11/26/24 CT Abdomen/Pelvis ([facility]) showed a moderate amount of stool in the colon. Hiatal hernia again noted. Prominent thickening of the visualized esophagus again noted. Exophytic component of the mass versus enlarged paraesophageal lymph node measuring ~ 1.9 x 1.3cm. - No evidence of acute inflammatory process in the abdomen or pelvis. - s/p left nephrectomy.  11/28/24 CT C/A/P ([facility]) Impression: - Mass-like thickening of the distal esophagus compatible with primary malignancy. - There is a 2cm right paraesophageal lymph node. - Multiple tiny pulmonary nodules. Although they dont have suspicious features, they are technically indeterminate because of their multiplicity and the patient's history. They are too small to evaluate via PET-CT. Can consider close interval follow-up.  12/4/24 PET/CT ([facility]) 1. Intense increased FDG avidity is associated with a segment of diffuse thickening of the mid and distal esophagus, SUV max 4.6 2. A 2cm paraesophageal node, has mild FDG avidity, SUV max 2.5. Another probable paraesophageal nodule at the level of the diaphragm measure 1.8 x 1.4cm, SUV max 2.7. 3. No FDG-avid pulmonary nodule. Previously seen small pulmonary nodules are below the resolution of PET-CT scan. A trace right pleural effusion is new and associated with minimal FDG avidity, SUV max 1.7.  12/10/24 Mr. Garner presents today for consultation for radiation as referred by Dr. Gerardo. They discussed carboplatin/paclitaxel concurrently with radiation. Patient reports of fatigue, hiccups, occasional loss of voice & hoarseness, and left chest discomfort/pressure. He denies any chest pain, SOB, difficulty swallowing. He started drinking boost as recommended by Dr. Gerardo, he is able to tolerate food without difficulty.  4/23/2025 Mr. Garner presents today for a follow up. He completed 23/23 fractions to a dose of 4140 cGy to the esophagus/LN on 1/24/2025.  2/19/2025 PET/CT ([facility]) revealed 1.  Intense activity in the distal esophagus.  The overall extent of activity is similar to that seen on the prior study, but maximum SUV has actually increased, probably related to radiation therapy. 2.  Previously identified lymph nodes show no significant FDG uptake, but review of the prior PET scan suggests that there may not have been significant activity prior to treatment. 3.  No abnormal FDG uptake is seen in the chest despite the presence of multiple micronodules. 4.  Patient is status post left nephrectomy (kidney donor).  Pathology reportedly shows an invasive poorly differentiated adenocarcinoma with signet ring cell and mucinous features.  It should be noted that both signet ring cell and mucinous tumors may have limited FDG affinity.  In some cases, FDG uptake may be related to an inflammatory reaction rather than tumor uptake suggesting that the identified lymph nodes on the CT scan may be falsely negative.  This phenomenon may explain the unusual increase in FDG uptake after treatment.  For that reason, if lymph nodes are positive at the time of surgery, caution should be exercised when relying on FDG PET scanning for follow-up studies.  The patient was scheduled for EGD, Robotic Assisted Three-Hole Esophagectomy, J-tube, Possible Laparotomy Possible Thoracotomy at [facility] on 3/3/2025 with Dr. Rojas and Dr. Mirza as co-surgeon on 3/3/2025.  Surgical Pathology revealed:  1. Esophageal nodule biopsy at 20 cm: - Squamous mucosa negative for carcinoma.  2. Lung, right lower lobe; wedge resection: - Lung tissue negative for carcinoma. - Emphysematous changes, areas of subpleural fibrosis, and metaplastic and reactive changes.  3. Mireya-esophageal lymph node: - Fibroadipose tissue negative for carcinoma.  4. Proximal esophageal margin; excision: - Esophageal tissue negative for carcinoma.  5. Esophagus and stomach; esophagogastrectomy: - Tumor bed negative for definitive evidence of residual carcinoma - see Note and Synoptic Report. - Ulcerated tumor bed measures 11.0 cm in greatest dimension (entirely submitted for histologic evaluation). - Treatment-related changes present with focal atypical cells, favor reactive. - Numerous mucin pools present in the tumor bed and in lymph nodes, but negative for residual tumor cells. - Thirty-seven lymph nodes negative for definitive evidence of residual carcinoma (0/37)  5: Esophagus Specimen Procedure:  Esophagogastrectomy Tumor Tumor Site:  Distal esophagus (low thoracic esophagus); Esophagogastric junction (EGJ) Relationship of Tumor to Esophagogastric Junction:    Tumor midpoint lies in the distal esophagus AND tumor involves the esophagogastric junction Distance of Tumor Center from Esophagogastric Junction:     3.0 cm Histologic Type:   No residual tumor cells identified Tumor Size:  Cannot be determined - No residual tumor cells identified Tumor Extent:   No evidence of primary tumor Treatment Effect:   Present, with no viable cancer cells (complete response, score 0) Lymphovascular Invasion:   Not identified Margins Margin Status for Invasive Carcinoma:    Not applicable Margin Status for Dysplasia and Intestinal Metaplasia:     Not applicable Regional Lymph Nodes Regional Lymph Node Status:   All regional lymph nodes negative for tumor Number of Lymph Nodes Examined:   37 Pathologic Stage Classification (pTNM, AJCC 8th Edition) TNM Descriptors:   y (post-treatment) pT Category:   pT0 pN Category:   pN0  Mr. Garner states that he has been doing extremely well since the surgery. He has had follow ups with Dr. Mirza and is continuing to get stronger every day. They removed the feeding tube. He is eating everything and with no difficulty swallowing. He was discharged from PT but will be looking to start some outpatient PT to increase his strength. He states that the arthritis is what is bothering him the most. He has an appointment with his PCP this week.

## 2025-04-24 NOTE — DISEASE MANAGEMENT
[Clinical] : TNM Stage: c [III] : III [TTNM] : 3 [NTNM] : 1 [MTNM] : 0 [de-identified] : completed 23/23 fractions to a dose of 4140 cGy to the esophagus/LN on 1/24/2025

## 2025-04-24 NOTE — REVIEW OF SYSTEMS
[Dysphagia: Grade 0] : Dysphagia: Grade 0 [Esophagitis: Grade 0] : Esophagitis: Grade 0 [Nausea: Grade 0] : Nausea: Grade 0 [Fatigue: Grade 1 - Fatigue relieved by rest] : Fatigue: Grade 1 - Fatigue relieved by rest [Dermatitis Radiation: Grade 0] : Dermatitis Radiation: Grade 0 [Negative] : Allergic/Immunologic [Fatigue] : no fatigue [Dysphagia] : no dysphagia [Chest Pain] : no chest pain [Shortness Of Breath] : no shortness of breath [Cough] : no cough [Constipation] : no constipation [Diarrhea] : no diarrhea

## 2025-04-24 NOTE — HISTORY OF PRESENT ILLNESS
[FreeTextEntry1] : Mr. 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 He completed 23/23 fractions to a dose of 4140 cGy to the esophagus/LN on 1/24/2025   He initially presented with unexpected weight loss associated with a poor appetite.  10/24/24 CT Abdomen/Pelvis ([facility]) Impression: 1. Moderate hiatal hernia. Prominent incompletely imaged thickening of the distal esophagus and hiatal hernia. Recommend correlation with upper endoscopy if not previously assessed. 2. S/p left nephrectomy. Cholelithiasis. No lymphadenopathy or ascites.  11/19/24 EGD with Dr. Bains showed a circumferential, friable, stenotic mass lesion extending from mid esophagus at 28cm to the EG Junction at 40cm. Pathology revealed an invasive poorly differentiated adenocarcinoma, with signet cell and mucinous features. The depth of invasion cannot be evaluated d/t the fragmentation and superficial nature of the specimens. MMR Intact. He also had a colonoscopy done at the same time showing tubular adenoma. Colonic mucosa, negative for dysplasia. There is no evidence of high-grade dysplasia or malignancy.  CA-19-9: 301 U/ml CEA: 16.6 ng/ml  11/26/24 CT Abdomen/Pelvis ([facility]) showed a moderate amount of stool in the colon. Hiatal hernia again noted. Prominent thickening of the visualized esophagus again noted. Exophytic component of the mass versus enlarged paraesophageal lymph node measuring ~ 1.9 x 1.3cm. - No evidence of acute inflammatory process in the abdomen or pelvis. - s/p left nephrectomy.  11/28/24 CT C/A/P ([facility]) Impression: - Mass-like thickening of the distal esophagus compatible with primary malignancy. - There is a 2cm right paraesophageal lymph node. - Multiple tiny pulmonary nodules. Although they dont have suspicious features, they are technically indeterminate because of their multiplicity and the patient's history. They are too small to evaluate via PET-CT. Can consider close interval follow-up.  12/4/24 PET/CT ([facility]) 1. Intense increased FDG avidity is associated with a segment of diffuse thickening of the mid and distal esophagus, SUV max 4.6 2. A 2cm paraesophageal node, has mild FDG avidity, SUV max 2.5. Another probable paraesophageal nodule at the level of the diaphragm measure 1.8 x 1.4cm, SUV max 2.7. 3. No FDG-avid pulmonary nodule. Previously seen small pulmonary nodules are below the resolution of PET-CT scan. A trace right pleural effusion is new and associated with minimal FDG avidity, SUV max 1.7.  12/10/24 Mr. Garner presents today for consultation for radiation as referred by Dr. Gerardo. They discussed carboplatin/paclitaxel concurrently with radiation. Patient reports of fatigue, hiccups, occasional loss of voice & hoarseness, and left chest discomfort/pressure. He denies any chest pain, SOB, difficulty swallowing. He started drinking boost as recommended by Dr. Gerardo, he is able to tolerate food without difficulty.  4/23/2025 Mr. Garner presents today for a follow up. He completed 23/23 fractions to a dose of 4140 cGy to the esophagus/LN on 1/24/2025.  2/19/2025 PET/CT ([facility]) revealed 1.  Intense activity in the distal esophagus.  The overall extent of activity is similar to that seen on the prior study, but maximum SUV has actually increased, probably related to radiation therapy. 2.  Previously identified lymph nodes show no significant FDG uptake, but review of the prior PET scan suggests that there may not have been significant activity prior to treatment. 3.  No abnormal FDG uptake is seen in the chest despite the presence of multiple micronodules. 4.  Patient is status post left nephrectomy (kidney donor).  Pathology reportedly shows an invasive poorly differentiated adenocarcinoma with signet ring cell and mucinous features.  It should be noted that both signet ring cell and mucinous tumors may have limited FDG affinity.  In some cases, FDG uptake may be related to an inflammatory reaction rather than tumor uptake suggesting that the identified lymph nodes on the CT scan may be falsely negative.  This phenomenon may explain the unusual increase in FDG uptake after treatment.  For that reason, if lymph nodes are positive at the time of surgery, caution should be exercised when relying on FDG PET scanning for follow-up studies.  The patient was scheduled for EGD, Robotic Assisted Three-Hole Esophagectomy, J-tube, Possible Laparotomy Possible Thoracotomy at [facility] on 3/3/2025 with Dr. Rojas and Dr. Mirza as co-surgeon on 3/3/2025.  Surgical Pathology revealed:  1. Esophageal nodule biopsy at 20 cm: - Squamous mucosa negative for carcinoma.  2. Lung, right lower lobe; wedge resection: - Lung tissue negative for carcinoma. - Emphysematous changes, areas of subpleural fibrosis, and metaplastic and reactive changes.  3. Mireya-esophageal lymph node: - Fibroadipose tissue negative for carcinoma.  4. Proximal esophageal margin; excision: - Esophageal tissue negative for carcinoma.  5. Esophagus and stomach; esophagogastrectomy: - Tumor bed negative for definitive evidence of residual carcinoma - see Note and Synoptic Report. - Ulcerated tumor bed measures 11.0 cm in greatest dimension (entirely submitted for histologic evaluation). - Treatment-related changes present with focal atypical cells, favor reactive. - Numerous mucin pools present in the tumor bed and in lymph nodes, but negative for residual tumor cells. - Thirty-seven lymph nodes negative for definitive evidence of residual carcinoma (0/37)  5: Esophagus Specimen Procedure:  Esophagogastrectomy Tumor Tumor Site:  Distal esophagus (low thoracic esophagus); Esophagogastric junction (EGJ) Relationship of Tumor to Esophagogastric Junction:    Tumor midpoint lies in the distal esophagus AND tumor involves the esophagogastric junction Distance of Tumor Center from Esophagogastric Junction:     3.0 cm Histologic Type:   No residual tumor cells identified Tumor Size:  Cannot be determined - No residual tumor cells identified Tumor Extent:   No evidence of primary tumor Treatment Effect:   Present, with no viable cancer cells (complete response, score 0) Lymphovascular Invasion:   Not identified Margins Margin Status for Invasive Carcinoma:    Not applicable Margin Status for Dysplasia and Intestinal Metaplasia:     Not applicable Regional Lymph Nodes Regional Lymph Node Status:   All regional lymph nodes negative for tumor Number of Lymph Nodes Examined:   37 Pathologic Stage Classification (pTNM, AJCC 8th Edition) TNM Descriptors:   y (post-treatment) pT Category:   pT0 pN Category:   pN0  Mr. Garner states that he has been doing extremely well since the surgery. He has had follow ups with Dr. Mirza and is continuing to get stronger every day. They removed the feeding tube. He is eating everything and with no difficulty swallowing. He was discharged from PT but will be looking to start some outpatient PT to increase his strength. He states that the arthritis is what is bothering him the most. He has an appointment with his PCP this week.